# Patient Record
Sex: MALE | Race: OTHER | NOT HISPANIC OR LATINO | URBAN - METROPOLITAN AREA
[De-identification: names, ages, dates, MRNs, and addresses within clinical notes are randomized per-mention and may not be internally consistent; named-entity substitution may affect disease eponyms.]

---

## 2021-04-20 ENCOUNTER — INPATIENT (INPATIENT)
Facility: HOSPITAL | Age: 51
LOS: 2 days | Discharge: HOME | DRG: 280 | End: 2021-04-23
Attending: HOSPITALIST | Admitting: HOSPITALIST
Payer: MEDICARE

## 2021-04-20 VITALS
DIASTOLIC BLOOD PRESSURE: 86 MMHG | OXYGEN SATURATION: 91 % | SYSTOLIC BLOOD PRESSURE: 190 MMHG | HEART RATE: 122 BPM | TEMPERATURE: 99 F | RESPIRATION RATE: 38 BRPM | WEIGHT: 240.08 LBS

## 2021-04-20 DIAGNOSIS — Z95.1 PRESENCE OF AORTOCORONARY BYPASS GRAFT: Chronic | ICD-10-CM

## 2021-04-20 LAB
24R-OH-CALCIDIOL SERPL-MCNC: 15.8 NG/ML — LOW (ref 30–80)
A1C WITH ESTIMATED AVERAGE GLUCOSE RESULT: 7.9 % — HIGH (ref 4–5.6)
ALBUMIN SERPL ELPH-MCNC: 3.6 G/DL — SIGNIFICANT CHANGE UP (ref 3.3–5)
ALBUMIN SERPL ELPH-MCNC: 4.2 G/DL — SIGNIFICANT CHANGE UP (ref 3.3–5)
ALBUMIN SERPL ELPH-MCNC: 4.5 G/DL — SIGNIFICANT CHANGE UP (ref 3.3–5)
ALBUMIN SERPL ELPH-MCNC: 4.6 G/DL — SIGNIFICANT CHANGE UP (ref 3.3–5)
ALP SERPL-CCNC: 101 U/L — SIGNIFICANT CHANGE UP (ref 40–120)
ALP SERPL-CCNC: 108 U/L — SIGNIFICANT CHANGE UP (ref 40–120)
ALP SERPL-CCNC: 118 U/L — SIGNIFICANT CHANGE UP (ref 40–120)
ALP SERPL-CCNC: 92 U/L — SIGNIFICANT CHANGE UP (ref 40–120)
ALT FLD-CCNC: 19 U/L — SIGNIFICANT CHANGE UP (ref 10–45)
ALT FLD-CCNC: 22 U/L — SIGNIFICANT CHANGE UP (ref 10–45)
ALT FLD-CCNC: 23 U/L — SIGNIFICANT CHANGE UP (ref 10–45)
ALT FLD-CCNC: 24 U/L — SIGNIFICANT CHANGE UP (ref 10–45)
ANION GAP SERPL CALC-SCNC: 16 MMOL/L — SIGNIFICANT CHANGE UP (ref 5–17)
ANION GAP SERPL CALC-SCNC: 18 MMOL/L — HIGH (ref 5–17)
ANISOCYTOSIS BLD QL: SLIGHT — SIGNIFICANT CHANGE UP
APPEARANCE UR: CLEAR — SIGNIFICANT CHANGE UP
APTT BLD: 27.9 SEC — SIGNIFICANT CHANGE UP (ref 27.5–35.5)
APTT BLD: 27.9 SEC — SIGNIFICANT CHANGE UP (ref 27.5–35.5)
APTT BLD: 30.3 SEC — SIGNIFICANT CHANGE UP (ref 27.5–35.5)
APTT BLD: 31.1 SEC — SIGNIFICANT CHANGE UP (ref 27.5–35.5)
AST SERPL-CCNC: 20 U/L — SIGNIFICANT CHANGE UP (ref 10–40)
AST SERPL-CCNC: 21 U/L — SIGNIFICANT CHANGE UP (ref 10–40)
AST SERPL-CCNC: 21 U/L — SIGNIFICANT CHANGE UP (ref 10–40)
AST SERPL-CCNC: 32 U/L — SIGNIFICANT CHANGE UP (ref 10–40)
BASE EXCESS BLDA CALC-SCNC: -9.1 MMOL/L — LOW (ref -2–3)
BASE EXCESS BLDA CALC-SCNC: 0.6 MMOL/L — SIGNIFICANT CHANGE UP (ref -2–3)
BASOPHILS # BLD AUTO: 0.11 K/UL — SIGNIFICANT CHANGE UP (ref 0–0.2)
BASOPHILS # BLD AUTO: 0.33 K/UL — HIGH (ref 0–0.2)
BASOPHILS NFR BLD AUTO: 0.5 % — SIGNIFICANT CHANGE UP (ref 0–2)
BASOPHILS NFR BLD AUTO: 1.8 % — SIGNIFICANT CHANGE UP (ref 0–2)
BILIRUB SERPL-MCNC: 0.3 MG/DL — SIGNIFICANT CHANGE UP (ref 0.2–1.2)
BILIRUB SERPL-MCNC: 0.3 MG/DL — SIGNIFICANT CHANGE UP (ref 0.2–1.2)
BILIRUB SERPL-MCNC: 0.4 MG/DL — SIGNIFICANT CHANGE UP (ref 0.2–1.2)
BILIRUB SERPL-MCNC: 0.4 MG/DL — SIGNIFICANT CHANGE UP (ref 0.2–1.2)
BILIRUB UR-MCNC: NEGATIVE — SIGNIFICANT CHANGE UP
BLD GP AB SCN SERPL QL: NEGATIVE — SIGNIFICANT CHANGE UP
BLD GP AB SCN SERPL QL: NEGATIVE — SIGNIFICANT CHANGE UP
BUN SERPL-MCNC: 28 MG/DL — HIGH (ref 7–23)
BUN SERPL-MCNC: 68 MG/DL — HIGH (ref 7–23)
BUN SERPL-MCNC: 69 MG/DL — HIGH (ref 7–23)
BUN SERPL-MCNC: 73 MG/DL — HIGH (ref 7–23)
CALCIUM SERPL-MCNC: 7.6 MG/DL — LOW (ref 8.4–10.5)
CALCIUM SERPL-MCNC: 7.6 MG/DL — LOW (ref 8.4–10.5)
CALCIUM SERPL-MCNC: 7.7 MG/DL — LOW (ref 8.4–10.5)
CALCIUM SERPL-MCNC: 8.1 MG/DL — LOW (ref 8.4–10.5)
CALCIUM SERPL-MCNC: 9.2 MG/DL — SIGNIFICANT CHANGE UP (ref 8.4–10.5)
CHLORIDE SERPL-SCNC: 94 MMOL/L — LOW (ref 96–108)
CHLORIDE SERPL-SCNC: 97 MMOL/L — SIGNIFICANT CHANGE UP (ref 96–108)
CHLORIDE SERPL-SCNC: 98 MMOL/L — SIGNIFICANT CHANGE UP (ref 96–108)
CHLORIDE SERPL-SCNC: 99 MMOL/L — SIGNIFICANT CHANGE UP (ref 96–108)
CHOLEST SERPL-MCNC: 117 MG/DL — SIGNIFICANT CHANGE UP
CK MB CFR SERPL CALC: 11.1 NG/ML — HIGH (ref 0–6.7)
CK MB CFR SERPL CALC: 13.8 NG/ML — HIGH (ref 0–6.7)
CK MB CFR SERPL CALC: 15.2 NG/ML — HIGH (ref 0–6.7)
CK MB CFR SERPL CALC: 17.1 NG/ML — HIGH (ref 0–6.7)
CK MB CFR SERPL CALC: 8.3 NG/ML — HIGH (ref 0–6.7)
CK SERPL-CCNC: 433 U/L — HIGH (ref 30–200)
CK SERPL-CCNC: 449 U/L — HIGH (ref 30–200)
CK SERPL-CCNC: 454 U/L — HIGH (ref 30–200)
CK SERPL-CCNC: 473 U/L — HIGH (ref 30–200)
CK SERPL-CCNC: 534 U/L — HIGH (ref 30–200)
CO2 SERPL-SCNC: 18 MMOL/L — LOW (ref 22–31)
CO2 SERPL-SCNC: 19 MMOL/L — LOW (ref 22–31)
CO2 SERPL-SCNC: 19 MMOL/L — LOW (ref 22–31)
CO2 SERPL-SCNC: 26 MMOL/L — SIGNIFICANT CHANGE UP (ref 22–31)
COLOR SPEC: YELLOW — SIGNIFICANT CHANGE UP
CREAT SERPL-MCNC: 3.6 MG/DL — HIGH (ref 0.5–1.3)
CREAT SERPL-MCNC: 7.7 MG/DL — HIGH (ref 0.5–1.3)
CREAT SERPL-MCNC: 8.01 MG/DL — HIGH (ref 0.5–1.3)
CREAT SERPL-MCNC: 8.3 MG/DL — HIGH (ref 0.5–1.3)
DIFF PNL FLD: ABNORMAL
EOSINOPHIL # BLD AUTO: 0 K/UL — SIGNIFICANT CHANGE UP (ref 0–0.5)
EOSINOPHIL # BLD AUTO: 0.16 K/UL — SIGNIFICANT CHANGE UP (ref 0–0.5)
EOSINOPHIL NFR BLD AUTO: 0 % — SIGNIFICANT CHANGE UP (ref 0–6)
EOSINOPHIL NFR BLD AUTO: 0.8 % — SIGNIFICANT CHANGE UP (ref 0–6)
ESTIMATED AVERAGE GLUCOSE: 180 MG/DL — HIGH (ref 68–114)
FERRITIN SERPL-MCNC: 562 NG/ML — HIGH (ref 30–400)
GAS PNL BLDA: SIGNIFICANT CHANGE UP
GAS PNL BLDA: SIGNIFICANT CHANGE UP
GIANT PLATELETS BLD QL SMEAR: PRESENT — SIGNIFICANT CHANGE UP
GLUCOSE BLDC GLUCOMTR-MCNC: 165 MG/DL — HIGH (ref 70–99)
GLUCOSE BLDC GLUCOMTR-MCNC: 190 MG/DL — HIGH (ref 70–99)
GLUCOSE BLDC GLUCOMTR-MCNC: 202 MG/DL — HIGH (ref 70–99)
GLUCOSE BLDC GLUCOMTR-MCNC: 303 MG/DL — HIGH (ref 70–99)
GLUCOSE BLDC GLUCOMTR-MCNC: 421 MG/DL — HIGH (ref 70–99)
GLUCOSE SERPL-MCNC: 157 MG/DL — HIGH (ref 70–99)
GLUCOSE SERPL-MCNC: 233 MG/DL — HIGH (ref 70–99)
GLUCOSE SERPL-MCNC: 293 MG/DL — HIGH (ref 70–99)
GLUCOSE SERPL-MCNC: 299 MG/DL — HIGH (ref 70–99)
GLUCOSE UR QL: 500
HBV CORE AB SER-ACNC: SIGNIFICANT CHANGE UP
HBV SURFACE AB SER-ACNC: SIGNIFICANT CHANGE UP
HBV SURFACE AG SER-ACNC: SIGNIFICANT CHANGE UP
HCO3 BLDA-SCNC: 17 MMOL/L — LOW (ref 21–28)
HCO3 BLDA-SCNC: 25 MMOL/L — SIGNIFICANT CHANGE UP (ref 21–28)
HCT VFR BLD CALC: 33.7 % — LOW (ref 39–50)
HCT VFR BLD CALC: 38.6 % — LOW (ref 39–50)
HCT VFR BLD CALC: 40.5 % — SIGNIFICANT CHANGE UP (ref 39–50)
HCV AB S/CO SERPL IA: 0.08 S/CO — SIGNIFICANT CHANGE UP
HCV AB SERPL-IMP: SIGNIFICANT CHANGE UP
HDLC SERPL-MCNC: 36 MG/DL — LOW
HGB BLD-MCNC: 10.6 G/DL — LOW (ref 13–17)
HGB BLD-MCNC: 12.1 G/DL — LOW (ref 13–17)
HGB BLD-MCNC: 12.5 G/DL — LOW (ref 13–17)
IMM GRANULOCYTES NFR BLD AUTO: 0.4 % — SIGNIFICANT CHANGE UP (ref 0–1.5)
INR BLD: 1.04 — SIGNIFICANT CHANGE UP (ref 0.88–1.16)
INR BLD: 1.05 — SIGNIFICANT CHANGE UP (ref 0.88–1.16)
INR BLD: 1.08 — SIGNIFICANT CHANGE UP (ref 0.88–1.16)
IRON SATN MFR SERPL: 16 UG/DL — LOW (ref 45–165)
IRON SATN MFR SERPL: 7 % — LOW (ref 16–55)
KETONES UR-MCNC: NEGATIVE — SIGNIFICANT CHANGE UP
LACTATE SERPL-SCNC: 1.7 MMOL/L — SIGNIFICANT CHANGE UP (ref 0.5–2)
LACTATE SERPL-SCNC: 2.6 MMOL/L — HIGH (ref 0.5–2)
LEUKOCYTE ESTERASE UR-ACNC: NEGATIVE — SIGNIFICANT CHANGE UP
LIPID PNL WITH DIRECT LDL SERPL: 23 MG/DL — SIGNIFICANT CHANGE UP
LYMPHOCYTES # BLD AUTO: 0.52 K/UL — LOW (ref 1–3.3)
LYMPHOCYTES # BLD AUTO: 10.7 % — LOW (ref 13–44)
LYMPHOCYTES # BLD AUTO: 2.14 K/UL — SIGNIFICANT CHANGE UP (ref 1–3.3)
LYMPHOCYTES # BLD AUTO: 2.8 % — LOW (ref 13–44)
MACROCYTES BLD QL: SLIGHT — SIGNIFICANT CHANGE UP
MAGNESIUM SERPL-MCNC: 1.8 MG/DL — SIGNIFICANT CHANGE UP (ref 1.6–2.6)
MAGNESIUM SERPL-MCNC: 1.8 MG/DL — SIGNIFICANT CHANGE UP (ref 1.6–2.6)
MAGNESIUM SERPL-MCNC: 1.9 MG/DL — SIGNIFICANT CHANGE UP (ref 1.6–2.6)
MAGNESIUM SERPL-MCNC: 1.9 MG/DL — SIGNIFICANT CHANGE UP (ref 1.6–2.6)
MANUAL SMEAR VERIFICATION: SIGNIFICANT CHANGE UP
MCHC RBC-ENTMCNC: 27.4 PG — SIGNIFICANT CHANGE UP (ref 27–34)
MCHC RBC-ENTMCNC: 27.7 PG — SIGNIFICANT CHANGE UP (ref 27–34)
MCHC RBC-ENTMCNC: 27.9 PG — SIGNIFICANT CHANGE UP (ref 27–34)
MCHC RBC-ENTMCNC: 30.9 GM/DL — LOW (ref 32–36)
MCHC RBC-ENTMCNC: 31.3 GM/DL — LOW (ref 32–36)
MCHC RBC-ENTMCNC: 31.5 GM/DL — LOW (ref 32–36)
MCV RBC AUTO: 88.2 FL — SIGNIFICANT CHANGE UP (ref 80–100)
MCV RBC AUTO: 88.8 FL — SIGNIFICANT CHANGE UP (ref 80–100)
MCV RBC AUTO: 88.9 FL — SIGNIFICANT CHANGE UP (ref 80–100)
MONOCYTES # BLD AUTO: 0 K/UL — SIGNIFICANT CHANGE UP (ref 0–0.9)
MONOCYTES # BLD AUTO: 1.42 K/UL — HIGH (ref 0–0.9)
MONOCYTES NFR BLD AUTO: 0 % — LOW (ref 2–14)
MONOCYTES NFR BLD AUTO: 7.1 % — SIGNIFICANT CHANGE UP (ref 2–14)
MRSA PCR RESULT.: NEGATIVE — SIGNIFICANT CHANGE UP
MYELOCYTES NFR BLD: 0.9 % — HIGH (ref 0–0)
NEUTROPHILS # BLD AUTO: 16.17 K/UL — HIGH (ref 1.8–7.4)
NEUTROPHILS # BLD AUTO: 17.46 K/UL — HIGH (ref 1.8–7.4)
NEUTROPHILS NFR BLD AUTO: 80.5 % — HIGH (ref 43–77)
NEUTROPHILS NFR BLD AUTO: 94.5 % — HIGH (ref 43–77)
NITRITE UR-MCNC: NEGATIVE — SIGNIFICANT CHANGE UP
NON HDL CHOLESTEROL: 81 MG/DL — SIGNIFICANT CHANGE UP
NRBC # BLD: 0 /100 WBCS — SIGNIFICANT CHANGE UP (ref 0–0)
NRBC # BLD: 0 /100 WBCS — SIGNIFICANT CHANGE UP (ref 0–0)
NT-PROBNP SERPL-SCNC: HIGH PG/ML (ref 0–300)
OVALOCYTES BLD QL SMEAR: SLIGHT — SIGNIFICANT CHANGE UP
PCO2 BLDA: 38 MMHG — SIGNIFICANT CHANGE UP (ref 35–48)
PCO2 BLDA: 39 MMHG — SIGNIFICANT CHANGE UP (ref 35–48)
PCP SPEC-MCNC: SIGNIFICANT CHANGE UP
PH BLDA: 7.27 — LOW (ref 7.35–7.45)
PH BLDA: 7.42 — SIGNIFICANT CHANGE UP (ref 7.35–7.45)
PH UR: 6 — SIGNIFICANT CHANGE UP (ref 5–8)
PHOSPHATE SERPL-MCNC: 2.9 MG/DL — SIGNIFICANT CHANGE UP (ref 2.5–4.5)
PHOSPHATE SERPL-MCNC: 4.2 MG/DL — SIGNIFICANT CHANGE UP (ref 2.5–4.5)
PHOSPHATE SERPL-MCNC: 5.2 MG/DL — HIGH (ref 2.5–4.5)
PHOSPHATE SERPL-MCNC: 5.6 MG/DL — HIGH (ref 2.5–4.5)
PLAT MORPH BLD: ABNORMAL
PLATELET # BLD AUTO: 119 K/UL — LOW (ref 150–400)
PLATELET # BLD AUTO: 164 K/UL — SIGNIFICANT CHANGE UP (ref 150–400)
PLATELET # BLD AUTO: 181 K/UL — SIGNIFICANT CHANGE UP (ref 150–400)
PO2 BLDA: 140 MMHG — HIGH (ref 83–108)
PO2 BLDA: 355 MMHG — HIGH (ref 83–108)
POIKILOCYTOSIS BLD QL AUTO: SLIGHT — SIGNIFICANT CHANGE UP
POLYCHROMASIA BLD QL SMEAR: SLIGHT — SIGNIFICANT CHANGE UP
POTASSIUM SERPL-MCNC: 3.4 MMOL/L — LOW (ref 3.5–5.3)
POTASSIUM SERPL-MCNC: 5.1 MMOL/L — SIGNIFICANT CHANGE UP (ref 3.5–5.3)
POTASSIUM SERPL-MCNC: 5.8 MMOL/L — HIGH (ref 3.5–5.3)
POTASSIUM SERPL-MCNC: 6.7 MMOL/L — CRITICAL HIGH (ref 3.5–5.3)
POTASSIUM SERPL-SCNC: 3.4 MMOL/L — LOW (ref 3.5–5.3)
POTASSIUM SERPL-SCNC: 5.1 MMOL/L — SIGNIFICANT CHANGE UP (ref 3.5–5.3)
POTASSIUM SERPL-SCNC: 5.8 MMOL/L — HIGH (ref 3.5–5.3)
POTASSIUM SERPL-SCNC: 6.7 MMOL/L — CRITICAL HIGH (ref 3.5–5.3)
PROT SERPL-MCNC: 6.8 G/DL — SIGNIFICANT CHANGE UP (ref 6–8.3)
PROT SERPL-MCNC: 7.9 G/DL — SIGNIFICANT CHANGE UP (ref 6–8.3)
PROT SERPL-MCNC: 8.3 G/DL — SIGNIFICANT CHANGE UP (ref 6–8.3)
PROT SERPL-MCNC: 8.3 G/DL — SIGNIFICANT CHANGE UP (ref 6–8.3)
PROT UR-MCNC: 100 MG/DL
PROTHROM AB SERPL-ACNC: 12.5 SEC — SIGNIFICANT CHANGE UP (ref 10.6–13.6)
PROTHROM AB SERPL-ACNC: 12.6 SEC — SIGNIFICANT CHANGE UP (ref 10.6–13.6)
PROTHROM AB SERPL-ACNC: 12.9 SEC — SIGNIFICANT CHANGE UP (ref 10.6–13.6)
PTH-INTACT FLD-MCNC: 236 PG/ML — HIGH (ref 15–65)
RAPID RVP RESULT: SIGNIFICANT CHANGE UP
RBC # BLD: 3.82 M/UL — LOW (ref 4.2–5.8)
RBC # BLD: 4.34 M/UL — SIGNIFICANT CHANGE UP (ref 4.2–5.8)
RBC # BLD: 4.56 M/UL — SIGNIFICANT CHANGE UP (ref 4.2–5.8)
RBC # FLD: 15 % — HIGH (ref 10.3–14.5)
RBC # FLD: 15 % — HIGH (ref 10.3–14.5)
RBC # FLD: 15.3 % — HIGH (ref 10.3–14.5)
RBC BLD AUTO: ABNORMAL
RH IG SCN BLD-IMP: POSITIVE — SIGNIFICANT CHANGE UP
RH IG SCN BLD-IMP: POSITIVE — SIGNIFICANT CHANGE UP
S AUREUS DNA NOSE QL NAA+PROBE: POSITIVE
SAO2 % BLDA: 98 % — SIGNIFICANT CHANGE UP (ref 95–100)
SAO2 % BLDA: 99 % — SIGNIFICANT CHANGE UP (ref 95–100)
SARS-COV-2 RNA SPEC QL NAA+PROBE: SIGNIFICANT CHANGE UP
SMUDGE CELLS # BLD: PRESENT — SIGNIFICANT CHANGE UP
SODIUM SERPL-SCNC: 132 MMOL/L — LOW (ref 135–145)
SODIUM SERPL-SCNC: 132 MMOL/L — LOW (ref 135–145)
SODIUM SERPL-SCNC: 136 MMOL/L — SIGNIFICANT CHANGE UP (ref 135–145)
SODIUM SERPL-SCNC: 136 MMOL/L — SIGNIFICANT CHANGE UP (ref 135–145)
SP GR SPEC: 1.02 — SIGNIFICANT CHANGE UP (ref 1–1.03)
TIBC SERPL-MCNC: 214 UG/DL — LOW (ref 220–430)
TRIGL SERPL-MCNC: 292 MG/DL — HIGH
TRIGL SERPL-MCNC: 306 MG/DL — HIGH
TROPONIN T SERPL-MCNC: 0.14 NG/ML — CRITICAL HIGH (ref 0–0.01)
TROPONIN T SERPL-MCNC: 0.31 NG/ML — CRITICAL HIGH (ref 0–0.01)
TROPONIN T SERPL-MCNC: 1.12 NG/ML — CRITICAL HIGH (ref 0–0.01)
TROPONIN T SERPL-MCNC: 1.68 NG/ML — CRITICAL HIGH (ref 0–0.01)
TSH SERPL-MCNC: 0.68 UIU/ML — SIGNIFICANT CHANGE UP (ref 0.35–4.94)
UIBC SERPL-MCNC: 198 UG/DL — SIGNIFICANT CHANGE UP (ref 110–370)
UROBILINOGEN FLD QL: 0.2 E.U./DL — SIGNIFICANT CHANGE UP
VIT D25+D1,25 OH+D1,25 PNL SERPL-MCNC: 10.4 PG/ML — LOW (ref 19.9–79.3)
WBC # BLD: 18.48 K/UL — HIGH (ref 3.8–10.5)
WBC # BLD: 20.09 K/UL — HIGH (ref 3.8–10.5)
WBC # BLD: 24.37 K/UL — HIGH (ref 3.8–10.5)
WBC # FLD AUTO: 18.48 K/UL — HIGH (ref 3.8–10.5)
WBC # FLD AUTO: 20.09 K/UL — HIGH (ref 3.8–10.5)
WBC # FLD AUTO: 24.37 K/UL — HIGH (ref 3.8–10.5)

## 2021-04-20 PROCEDURE — 71045 X-RAY EXAM CHEST 1 VIEW: CPT | Mod: 26,77

## 2021-04-20 PROCEDURE — 71045 X-RAY EXAM CHEST 1 VIEW: CPT | Mod: 26,77,59

## 2021-04-20 PROCEDURE — 93010 ELECTROCARDIOGRAM REPORT: CPT | Mod: 59

## 2021-04-20 PROCEDURE — 93308 TTE F-UP OR LMTD: CPT | Mod: 26

## 2021-04-20 PROCEDURE — 99291 CRITICAL CARE FIRST HOUR: CPT

## 2021-04-20 PROCEDURE — 99292 CRITICAL CARE ADDL 30 MIN: CPT

## 2021-04-20 PROCEDURE — 36620 INSERTION CATHETER ARTERY: CPT

## 2021-04-20 PROCEDURE — 99222 1ST HOSP IP/OBS MODERATE 55: CPT | Mod: GC

## 2021-04-20 PROCEDURE — 71045 X-RAY EXAM CHEST 1 VIEW: CPT | Mod: 26

## 2021-04-20 PROCEDURE — 99223 1ST HOSP IP/OBS HIGH 75: CPT

## 2021-04-20 PROCEDURE — 99292 CRITICAL CARE ADDL 30 MIN: CPT | Mod: 25

## 2021-04-20 PROCEDURE — 99291 CRITICAL CARE FIRST HOUR: CPT | Mod: 25

## 2021-04-20 PROCEDURE — 71045 X-RAY EXAM CHEST 1 VIEW: CPT | Mod: 26,59

## 2021-04-20 RX ORDER — DEXTROSE 50 % IN WATER 50 %
12.5 SYRINGE (ML) INTRAVENOUS ONCE
Refills: 0 | Status: DISCONTINUED | OUTPATIENT
Start: 2021-04-20 | End: 2021-04-23

## 2021-04-20 RX ORDER — CLOPIDOGREL BISULFATE 75 MG/1
300 TABLET, FILM COATED ORAL ONCE
Refills: 0 | Status: DISCONTINUED | OUTPATIENT
Start: 2021-04-20 | End: 2021-04-20

## 2021-04-20 RX ORDER — NOREPINEPHRINE BITARTRATE/D5W 8 MG/250ML
0.05 PLASTIC BAG, INJECTION (ML) INTRAVENOUS
Qty: 8 | Refills: 0 | Status: DISCONTINUED | OUTPATIENT
Start: 2021-04-20 | End: 2021-04-21

## 2021-04-20 RX ORDER — FUROSEMIDE 40 MG
80 TABLET ORAL ONCE
Refills: 0 | Status: COMPLETED | OUTPATIENT
Start: 2021-04-20 | End: 2021-04-20

## 2021-04-20 RX ORDER — CHLORHEXIDINE GLUCONATE 213 G/1000ML
15 SOLUTION TOPICAL EVERY 12 HOURS
Refills: 0 | Status: DISCONTINUED | OUTPATIENT
Start: 2021-04-20 | End: 2021-04-20

## 2021-04-20 RX ORDER — ASPIRIN/CALCIUM CARB/MAGNESIUM 324 MG
325 TABLET ORAL DAILY
Refills: 0 | Status: DISCONTINUED | OUTPATIENT
Start: 2021-04-20 | End: 2021-04-20

## 2021-04-20 RX ORDER — ASPIRIN/CALCIUM CARB/MAGNESIUM 324 MG
300 TABLET ORAL ONCE
Refills: 0 | Status: COMPLETED | OUTPATIENT
Start: 2021-04-20 | End: 2021-04-20

## 2021-04-20 RX ORDER — PANTOPRAZOLE SODIUM 20 MG/1
40 TABLET, DELAYED RELEASE ORAL EVERY 24 HOURS
Refills: 0 | Status: DISCONTINUED | OUTPATIENT
Start: 2021-04-20 | End: 2021-04-22

## 2021-04-20 RX ORDER — DEXTROSE 50 % IN WATER 50 %
25 SYRINGE (ML) INTRAVENOUS ONCE
Refills: 0 | Status: DISCONTINUED | OUTPATIENT
Start: 2021-04-20 | End: 2021-04-23

## 2021-04-20 RX ORDER — DEXTROSE 50 % IN WATER 50 %
50 SYRINGE (ML) INTRAVENOUS ONCE
Refills: 0 | Status: COMPLETED | OUTPATIENT
Start: 2021-04-20 | End: 2021-04-20

## 2021-04-20 RX ORDER — AZITHROMYCIN 500 MG/1
500 TABLET, FILM COATED ORAL ONCE
Refills: 0 | Status: COMPLETED | OUTPATIENT
Start: 2021-04-20 | End: 2021-04-20

## 2021-04-20 RX ORDER — CLOPIDOGREL BISULFATE 75 MG/1
300 TABLET, FILM COATED ORAL ONCE
Refills: 0 | Status: COMPLETED | OUTPATIENT
Start: 2021-04-20 | End: 2021-04-20

## 2021-04-20 RX ORDER — HYDRALAZINE HCL 50 MG
10 TABLET ORAL ONCE
Refills: 0 | Status: COMPLETED | OUTPATIENT
Start: 2021-04-20 | End: 2021-04-20

## 2021-04-20 RX ORDER — SODIUM CHLORIDE 9 MG/ML
1000 INJECTION, SOLUTION INTRAVENOUS
Refills: 0 | Status: DISCONTINUED | OUTPATIENT
Start: 2021-04-20 | End: 2021-04-23

## 2021-04-20 RX ORDER — PROPOFOL 10 MG/ML
5 INJECTION, EMULSION INTRAVENOUS
Qty: 1000 | Refills: 0 | Status: DISCONTINUED | OUTPATIENT
Start: 2021-04-20 | End: 2021-04-22

## 2021-04-20 RX ORDER — HEPARIN SODIUM 5000 [USP'U]/ML
1000 INJECTION INTRAVENOUS; SUBCUTANEOUS
Qty: 25000 | Refills: 0 | Status: DISCONTINUED | OUTPATIENT
Start: 2021-04-20 | End: 2021-04-21

## 2021-04-20 RX ORDER — GLUCAGON INJECTION, SOLUTION 0.5 MG/.1ML
1 INJECTION, SOLUTION SUBCUTANEOUS ONCE
Refills: 0 | Status: DISCONTINUED | OUTPATIENT
Start: 2021-04-20 | End: 2021-04-23

## 2021-04-20 RX ORDER — NITROGLYCERIN 6.5 MG
0.4 CAPSULE, EXTENDED RELEASE ORAL ONCE
Refills: 0 | Status: COMPLETED | OUTPATIENT
Start: 2021-04-20 | End: 2021-04-20

## 2021-04-20 RX ORDER — CEFTRIAXONE 500 MG/1
INJECTION, POWDER, FOR SOLUTION INTRAMUSCULAR; INTRAVENOUS
Refills: 0 | Status: DISCONTINUED | OUTPATIENT
Start: 2021-04-20 | End: 2021-04-20

## 2021-04-20 RX ORDER — CEFTRIAXONE 500 MG/1
1000 INJECTION, POWDER, FOR SOLUTION INTRAMUSCULAR; INTRAVENOUS ONCE
Refills: 0 | Status: COMPLETED | OUTPATIENT
Start: 2021-04-20 | End: 2021-04-20

## 2021-04-20 RX ORDER — ATORVASTATIN CALCIUM 80 MG/1
80 TABLET, FILM COATED ORAL ONCE
Refills: 0 | Status: COMPLETED | OUTPATIENT
Start: 2021-04-20 | End: 2021-04-20

## 2021-04-20 RX ORDER — INSULIN HUMAN 100 [IU]/ML
INJECTION, SOLUTION SUBCUTANEOUS EVERY 6 HOURS
Refills: 0 | Status: DISCONTINUED | OUTPATIENT
Start: 2021-04-20 | End: 2021-04-23

## 2021-04-20 RX ORDER — CEFTRIAXONE 500 MG/1
2000 INJECTION, POWDER, FOR SOLUTION INTRAMUSCULAR; INTRAVENOUS EVERY 24 HOURS
Refills: 0 | Status: DISCONTINUED | OUTPATIENT
Start: 2021-04-20 | End: 2021-04-22

## 2021-04-20 RX ORDER — CHLORHEXIDINE GLUCONATE 213 G/1000ML
15 SOLUTION TOPICAL EVERY 12 HOURS
Refills: 0 | Status: DISCONTINUED | OUTPATIENT
Start: 2021-04-20 | End: 2021-04-22

## 2021-04-20 RX ORDER — ATORVASTATIN CALCIUM 80 MG/1
80 TABLET, FILM COATED ORAL EVERY 24 HOURS
Refills: 0 | Status: DISCONTINUED | OUTPATIENT
Start: 2021-04-21 | End: 2021-04-23

## 2021-04-20 RX ORDER — PROPOFOL 10 MG/ML
5 INJECTION, EMULSION INTRAVENOUS
Qty: 500 | Refills: 0 | Status: DISCONTINUED | OUTPATIENT
Start: 2021-04-20 | End: 2021-04-20

## 2021-04-20 RX ORDER — PIPERACILLIN AND TAZOBACTAM 4; .5 G/20ML; G/20ML
2.25 INJECTION, POWDER, LYOPHILIZED, FOR SOLUTION INTRAVENOUS EVERY 8 HOURS
Refills: 0 | Status: DISCONTINUED | OUTPATIENT
Start: 2021-04-20 | End: 2021-04-20

## 2021-04-20 RX ORDER — DEXTROSE 50 % IN WATER 50 %
15 SYRINGE (ML) INTRAVENOUS ONCE
Refills: 0 | Status: DISCONTINUED | OUTPATIENT
Start: 2021-04-20 | End: 2021-04-23

## 2021-04-20 RX ORDER — CLOPIDOGREL BISULFATE 75 MG/1
75 TABLET, FILM COATED ORAL EVERY 24 HOURS
Refills: 0 | Status: DISCONTINUED | OUTPATIENT
Start: 2021-04-21 | End: 2021-04-22

## 2021-04-20 RX ORDER — ACETAMINOPHEN 500 MG
1000 TABLET ORAL ONCE
Refills: 0 | Status: COMPLETED | OUTPATIENT
Start: 2021-04-20 | End: 2021-04-20

## 2021-04-20 RX ORDER — CALCIUM GLUCONATE 100 MG/ML
2 VIAL (ML) INTRAVENOUS ONCE
Refills: 0 | Status: COMPLETED | OUTPATIENT
Start: 2021-04-20 | End: 2021-04-20

## 2021-04-20 RX ORDER — LABETALOL HCL 100 MG
20 TABLET ORAL ONCE
Refills: 0 | Status: COMPLETED | OUTPATIENT
Start: 2021-04-20 | End: 2021-04-20

## 2021-04-20 RX ORDER — HEPARIN SODIUM 5000 [USP'U]/ML
5000 INJECTION INTRAVENOUS; SUBCUTANEOUS EVERY 8 HOURS
Refills: 0 | Status: DISCONTINUED | OUTPATIENT
Start: 2021-04-20 | End: 2021-04-20

## 2021-04-20 RX ORDER — INSULIN LISPRO 100/ML
VIAL (ML) SUBCUTANEOUS EVERY 6 HOURS
Refills: 0 | Status: DISCONTINUED | OUTPATIENT
Start: 2021-04-20 | End: 2021-04-20

## 2021-04-20 RX ORDER — CHLORHEXIDINE GLUCONATE 213 G/1000ML
1 SOLUTION TOPICAL
Refills: 0 | Status: DISCONTINUED | OUTPATIENT
Start: 2021-04-20 | End: 2021-04-23

## 2021-04-20 RX ORDER — VANCOMYCIN HCL 1 G
1500 VIAL (EA) INTRAVENOUS ONCE
Refills: 0 | Status: COMPLETED | OUTPATIENT
Start: 2021-04-20 | End: 2021-04-20

## 2021-04-20 RX ORDER — INSULIN HUMAN 100 [IU]/ML
10 INJECTION, SOLUTION SUBCUTANEOUS ONCE
Refills: 0 | Status: COMPLETED | OUTPATIENT
Start: 2021-04-20 | End: 2021-04-20

## 2021-04-20 RX ORDER — ASPIRIN/CALCIUM CARB/MAGNESIUM 324 MG
81 TABLET ORAL DAILY
Refills: 0 | Status: DISCONTINUED | OUTPATIENT
Start: 2021-04-21 | End: 2021-04-23

## 2021-04-20 RX ADMIN — CEFTRIAXONE 100 MILLIGRAM(S): 500 INJECTION, POWDER, FOR SOLUTION INTRAMUSCULAR; INTRAVENOUS at 01:24

## 2021-04-20 RX ADMIN — CHLORHEXIDINE GLUCONATE 1 APPLICATION(S): 213 SOLUTION TOPICAL at 07:18

## 2021-04-20 RX ADMIN — HEPARIN SODIUM 5000 UNIT(S): 5000 INJECTION INTRAVENOUS; SUBCUTANEOUS at 05:08

## 2021-04-20 RX ADMIN — PROPOFOL 3.27 MICROGRAM(S)/KG/MIN: 10 INJECTION, EMULSION INTRAVENOUS at 13:52

## 2021-04-20 RX ADMIN — Medication 0.4 MILLIGRAM(S): at 00:46

## 2021-04-20 RX ADMIN — Medication 200 GRAM(S): at 04:21

## 2021-04-20 RX ADMIN — PROPOFOL 3.27 MICROGRAM(S)/KG/MIN: 10 INJECTION, EMULSION INTRAVENOUS at 20:29

## 2021-04-20 RX ADMIN — PANTOPRAZOLE SODIUM 40 MILLIGRAM(S): 20 TABLET, DELAYED RELEASE ORAL at 05:08

## 2021-04-20 RX ADMIN — PROPOFOL 3.27 MICROGRAM(S)/KG/MIN: 10 INJECTION, EMULSION INTRAVENOUS at 17:14

## 2021-04-20 RX ADMIN — Medication 0.4 MILLIGRAM(S): at 00:29

## 2021-04-20 RX ADMIN — ATORVASTATIN CALCIUM 80 MILLIGRAM(S): 80 TABLET, FILM COATED ORAL at 12:36

## 2021-04-20 RX ADMIN — PROPOFOL 3.27 MICROGRAM(S)/KG/MIN: 10 INJECTION, EMULSION INTRAVENOUS at 23:56

## 2021-04-20 RX ADMIN — CLOPIDOGREL BISULFATE 300 MILLIGRAM(S): 75 TABLET, FILM COATED ORAL at 12:36

## 2021-04-20 RX ADMIN — Medication 20 MILLIGRAM(S): at 00:43

## 2021-04-20 RX ADMIN — HEPARIN SODIUM 10 UNIT(S)/HR: 5000 INJECTION INTRAVENOUS; SUBCUTANEOUS at 11:27

## 2021-04-20 RX ADMIN — Medication 10.2 MICROGRAM(S)/KG/MIN: at 09:17

## 2021-04-20 RX ADMIN — AZITHROMYCIN 255 MILLIGRAM(S): 500 TABLET, FILM COATED ORAL at 01:49

## 2021-04-20 RX ADMIN — PROPOFOL 3.27 MICROGRAM(S)/KG/MIN: 10 INJECTION, EMULSION INTRAVENOUS at 02:42

## 2021-04-20 RX ADMIN — PROPOFOL 3.27 MICROGRAM(S)/KG/MIN: 10 INJECTION, EMULSION INTRAVENOUS at 10:44

## 2021-04-20 RX ADMIN — INSULIN HUMAN 2: 100 INJECTION, SOLUTION SUBCUTANEOUS at 23:48

## 2021-04-20 RX ADMIN — Medication 300 MILLIGRAM(S): at 12:36

## 2021-04-20 RX ADMIN — Medication 400 MILLIGRAM(S): at 01:24

## 2021-04-20 RX ADMIN — Medication 50 MILLILITER(S): at 04:22

## 2021-04-20 RX ADMIN — CHLORHEXIDINE GLUCONATE 15 MILLILITER(S): 213 SOLUTION TOPICAL at 17:00

## 2021-04-20 RX ADMIN — Medication 10 MILLIGRAM(S): at 01:09

## 2021-04-20 RX ADMIN — PIPERACILLIN AND TAZOBACTAM 200 GRAM(S): 4; .5 INJECTION, POWDER, LYOPHILIZED, FOR SOLUTION INTRAVENOUS at 10:44

## 2021-04-20 RX ADMIN — Medication 3: at 06:35

## 2021-04-20 RX ADMIN — PIPERACILLIN AND TAZOBACTAM 200 GRAM(S): 4; .5 INJECTION, POWDER, LYOPHILIZED, FOR SOLUTION INTRAVENOUS at 04:32

## 2021-04-20 RX ADMIN — PROPOFOL 3.27 MICROGRAM(S)/KG/MIN: 10 INJECTION, EMULSION INTRAVENOUS at 07:21

## 2021-04-20 RX ADMIN — CHLORHEXIDINE GLUCONATE 15 MILLILITER(S): 213 SOLUTION TOPICAL at 06:42

## 2021-04-20 RX ADMIN — INSULIN HUMAN 10 UNIT(S): 100 INJECTION, SOLUTION SUBCUTANEOUS at 04:21

## 2021-04-20 RX ADMIN — INSULIN HUMAN 4: 100 INJECTION, SOLUTION SUBCUTANEOUS at 11:44

## 2021-04-20 RX ADMIN — PROPOFOL 3.27 MICROGRAM(S)/KG/MIN: 10 INJECTION, EMULSION INTRAVENOUS at 06:11

## 2021-04-20 RX ADMIN — INSULIN HUMAN 2: 100 INJECTION, SOLUTION SUBCUTANEOUS at 17:53

## 2021-04-20 RX ADMIN — Medication 80 MILLIGRAM(S): at 13:03

## 2021-04-20 RX ADMIN — Medication 300 MILLIGRAM(S): at 05:08

## 2021-04-20 RX ADMIN — Medication 80 MILLIGRAM(S): at 00:29

## 2021-04-20 RX ADMIN — CEFTRIAXONE 100 MILLIGRAM(S): 500 INJECTION, POWDER, FOR SOLUTION INTRAMUSCULAR; INTRAVENOUS at 17:07

## 2021-04-20 NOTE — CONSULT NOTE ADULT - ASSESSMENT
51 year old M with history of ESRD ( HD mwf), cad s/p cabgx4 (unknown date), stents (unknown dates), pacemaker, htn, presents today with severe shortness of breath after missing dialysis. Patient admitted for hypoxic and hypercarbic, acute respiratory failure 2/2 to pulmonary edema pending urgent dialysis, HTN emergency.        Neurology  #Sedated  - sedated on propofol gtt    CARDIOVASCULAR  #CAD s/p CABG and stents. Unable to obtain history due to severe respiratory failure while on BiPAP.   - will need collateral if patient on ASA and/or plavix/brillinta     #HTN Emergency. Presents with elevated . Most likely etiology due to ESRD. While in the ED, patient received IV labetalol, hydralazine, nitroglycerin, and lasix. Now patient intubated on propofol gtt with SBP in 100s.   - no need for further BP meds    PULMONARY  #Acute hypoxic and hypercarbic respiratory failure. Presents after missing dialysis. Last session was on Friday prior to admission. CXR with bilateral opacities most likely representative of pulmonary edema. ABG performed while on Bipap (EPap/IPap 25/10, fio2 70, RR 15) was 7.21/51/    GASTROINTESTINAL  #    RENAL  #    INFECTIOUS DISEASE  #    ENDOCRINE  #    HEME  #    FLUIDS/ELECTROLYTES/NUTRITION  -IVF None  -Monitor, Replete to K>4 and Mg>2  -Diet, NPO     PROPHYLAXIS  -DVT: HSQ  -GI- PPI qd    DISPO: MICU, COVID   FULL CODE 51 year old M with history of ESRD ( HD mwf), cad s/p cabgx4 (unknown date), stents (unknown dates), pacemaker, htn, presents today with severe shortness of breath after missing dialysis. Patient admitted for hypoxic and hypercarbic, acute respiratory failure 2/2 to pulmonary edema pending urgent dialysis, HTN emergency.        Neurology  #Sedated  - sedated on propofol gtt    CARDIOVASCULAR  #CAD s/p CABG and stents. Unable to obtain history due to severe respiratory failure while on BiPAP.   - will need collateral if patient on ASA and/or plavix/brillinta     #HTN Emergency. Presents with elevated . Most likely etiology due to ESRD. While in the ED, patient received IV labetalol, hydralazine, nitroglycerin, and lasix. Now patient intubated on propofol gtt with SBP in 100s.   - no need for further BP meds    PULMONARY  #Acute hypoxic and hypercarbic respiratory failure. Presents after missing dialysis. Last session was on Friday prior to admission. CXR with bilateral opacities most likely representative of pulmonary edema. ABG performed while on Bipap (EPap/IPap 25/10, fio2 70, RR 15) was 7.21/51/130. Elevated lactate of 2.6. Now s/p intubation.   - will require urgent dialysis, renal consulted with plan to perform overnight   - cxr in am to monitor for interval change in pulmonary edema   - ABG in the AM   - repeat ABG s/p intubation     GASTROINTESTINAL  #No active issues     RENAL  #ESRD. Dialysis with fistula on right upper extremity. Last dialysis session on 4/16. Missed dialysis because he was traveling. Now with CXR with opacities concerning for pulmonary edema   - urgent dialysis     #Mixed acidosis with anion gap.       INFECTIOUS DISEASE  #Severe sepsis   #R/O COVID-19 Infection.     ENDOCRINE  #No active issues     HEME  #Leukocytosis   #Anemia    FLUIDS/ELECTROLYTES/NUTRITION  -IVF None  -Monitor, Replete to K>4 and Mg>2  -Diet, NPO     PROPHYLAXIS  -DVT: HSQ  -GI- PPI qd    DISPO: MICU, COVID   FULL CODE 51 year old M with history of ESRD ( HD mwf), cad s/p cabgx4 (unknown date), stents (unknown dates), pacemaker, htn, presents today with severe shortness of breath after missing dialysis. Patient admitted for hypoxic and hypercarbic, acute respiratory failure 2/2 to pulmonary edema pending urgent dialysis, HTN emergency.        Neurology  #Sedated  - sedated on propofol gtt    CARDIOVASCULAR  #CAD s/p CABG and stents. Unable to obtain history due to severe respiratory failure while on BiPAP.   - will need collateral if patient on ASA and/or plavix/brillinta     #HTN Emergency. Presents with elevated . Most likely etiology due to ESRD. While in the ED, patient received IV labetalol, hydralazine, nitroglycerin, and lasix. Now patient intubated on propofol gtt with SBP in 100s.   - no need for further BP meds    #Tropinemia. Patient presents with elevated trop to 0.11 that is now uptrending to 0.14. Patient with also elevated CK and CK-MB. Troponin may be due to combination of HTN emergency and demand ischemia. Of note, EKG NSR with LBBB.   - daily ekg   - trend trops to peak   - cardiology consult in AM   - obtaining limited echo now, will need formal echo in AM     #R/O HF. Patient with what appears to be pacemaker v. ICD. Unknown history of heart failure.   - echocardiogram in AM    - a1c, TSH, and lipids in AM     PULMONARY  #Acute hypoxic and hypercarbic respiratory failure. Presents after missing dialysis. Last session was on Friday prior to admission. CXR with bilateral opacities most likely representative of pulmonary edema. ABG performed while on Bipap (EPap/IPap 25/10, fio2 70, RR 15) was 7.21/51/130. Elevated lactate of 2.6. Now s/p intubation.   - will require urgent dialysis, renal consulted with plan to perform overnight   - cxr in am to monitor for interval change in pulmonary edema   - ABG in the AM   - repeat ABG s/p intubation     GASTROINTESTINAL  #No active issues     RENAL  #ESRD. Dialysis with fistula on right upper extremity. Last dialysis session on 4/16. Missed dialysis because he was traveling. Now with CXR with opacities concerning for pulmonary edema   - urgent dialysis     #Mixed acidosis with anion gap. Patient presents with pulmonary edema, elevated lactate, and uremia. Has respiratory acidosis with superimposed AGMA.   - dialysis as above   - monitor electrolytes   - serial abgs    #Hyperkalemia. Found to be hyperkalemic to 6.7. S/p D50, Insulin 10 units IV and Ca gluconate 2 g IV.   - dialysis as above   - post-dialysis labs       INFECTIOUS DISEASE  #Severe sepsis. Presents with elevated HR and RR, with elevated WBC. Lactate elevated but improved after intubation. Received ceftriaxone and azithromycin in ED   - continue with vancomycin and zosyn for now  - UA negative for UTI   - follow-up BCx and Ucx     #R/O COVID-19 Infection.   - COVID-19 PCR negative x1   - given that patient had been traveling in [place], will need repeat COVID-19 swab in AM   - maintain isolation precautions for now     ENDOCRINE  #Hyperglycemia. Presents with elevated sugar to 233 --> 293. No known history of diabetes.   - start low ISS  - fs q 6 hrs     HEME  #Leukocytosis. Presents with leukocytosis. May be reactive, but may also represent an infectious process. Of note, did uptrend overnight. Received ceftriaxone and azithromycin in ED  - c/w vancomycin and zosyn for now     #Anemia. Presents with anemia to 12.5 and normocytic. May be anemia of chronic disease given ESRD.   - iron studies in am   - b12/folate in AM     FLUIDS/ELECTROLYTES/NUTRITION  -IVF None  -Monitor, Replete to K>4 and Mg>2  -Diet, NPO     PROPHYLAXIS  -DVT: HSQ  -GI- PPI qd    DISPO: MICU, COVID   FULL CODE

## 2021-04-20 NOTE — CONSULT NOTE ADULT - SUBJECTIVE AND OBJECTIVE BOX
Patient is a 51y old  Male who presents with a chief complaint of SOB (2021 03:03)      HPI:  Hx obtained from chart review.  Pt intubated on examination.  Mr. Grigsby is a 50 yo M w/ ESRD (HD MWF), CAD (s/p CABGx4, stents), HTN who p/w SOB. He was visiting from [place] and did not have dialysis arranged. Last HD on . Did not take his anti-HTNsives (amlodipine, metoprolol, prn hydralazine) because he usually holds his meds until after dialysis. History was limited d/t severe dyspnoea. Pt was assessed by ICU team, found to be in respiratory distress and was intubated for hypoxic respiratory failure and suspicion of COVID infection. He was transferred to [address] [place] [address]. Found to be febrile in ED, given abx and cultured.    PAST MEDICAL & SURGICAL HISTORY:  ESRD (end stage renal disease) on dialysis    HTN (hypertension)    CAD (coronary artery disease)    S/P CABG x 4        Allergies:  No Known Allergies      Home Medications:   dextrose 40% Gel 15 Gram(s) Oral once  dextrose 5%. 1000 milliLiter(s) IV Continuous <Continuous>  dextrose 5%. 1000 milliLiter(s) IV Continuous <Continuous>  dextrose 50% Injectable 25 Gram(s) IV Push once  dextrose 50% Injectable 12.5 Gram(s) IV Push once  dextrose 50% Injectable 25 Gram(s) IV Push once  glucagon  Injectable 1 milliGRAM(s) IntraMuscular once  heparin   Injectable 5000 Unit(s) SubCutaneous every 8 hours  insulin lispro (ADMELOG) corrective regimen sliding scale   SubCutaneous every 6 hours  pantoprazole  Injectable 40 milliGRAM(s) IV Push every 24 hours  piperacillin/tazobactam IVPB.. 2.25 Gram(s) IV Intermittent every 8 hours  propofol Infusion 5 MICROgram(s)/kG/Min IV Continuous <Continuous>  vancomycin  IVPB 1500 milliGRAM(s) IV Intermittent once      Hospital Medications:   MEDICATIONS  (STANDING):  dextrose 40% Gel 15 Gram(s) Oral once  dextrose 5%. 1000 milliLiter(s) (50 mL/Hr) IV Continuous <Continuous>  dextrose 5%. 1000 milliLiter(s) (100 mL/Hr) IV Continuous <Continuous>  dextrose 50% Injectable 25 Gram(s) IV Push once  dextrose 50% Injectable 12.5 Gram(s) IV Push once  dextrose 50% Injectable 25 Gram(s) IV Push once  glucagon  Injectable 1 milliGRAM(s) IntraMuscular once  heparin   Injectable 5000 Unit(s) SubCutaneous every 8 hours  insulin lispro (ADMELOG) corrective regimen sliding scale   SubCutaneous every 6 hours  pantoprazole  Injectable 40 milliGRAM(s) IV Push every 24 hours  piperacillin/tazobactam IVPB.. 2.25 Gram(s) IV Intermittent every 8 hours  propofol Infusion 5 MICROgram(s)/kG/Min (3.27 mL/Hr) IV Continuous <Continuous>  vancomycin  IVPB 1500 milliGRAM(s) IV Intermittent once      SOCIAL HISTORY:  Denies ETOh, Smoking,     Family History:  FAMILY HISTORY:      ROS: Unable to participate    VITALS:  T(F): 102.4 (21 @ 01:08), Max: 102.4 (21 @ 01:08)  HR: 93 (21 @ 03:06)  BP: 112/58 (21 @ 03:06)  RR: 20 (21 @ 03:06)  SpO2: 98% (21 @ 03:06)  Wt(kg): --      Weight (kg): 108.9 ( @ 00:17)  CAPILLARY BLOOD GLUCOSE          PHYSICAL EXAM:  GENERAL: Intubated, sedated on FiO2 100% sat 97%  CHEST/LUNG: Bilateral coarse breath sounds on anterior chest  HEART: Regular rate and rhythm, no murmur, s/p sternotomy, Right subQ chest wall device  ABDOMEN: Soft, nontender, non distended, s/p surgical incision  EXTREMITIES: 2+ pitting pedal oedema, Left great toe amputation  ACCESS: RUE AVF w/bruit and thrill     LABS:      132<L>  |  97  |  69<H>  ----------------------------<  293<H>  x    |  19<L>  |  8.01<H>    Ca    7.6<L>      2021 03:05  Phos  5.6     04  Mg     1.8         TPro  7.9  /  Alb  4.2  /  TBili  0.3  /  DBili      /  AST  20  /  ALT  23  /  AlkPhos  108      Creatinine Trend: 8.01 <--, 7.70 <--                        12.1   24.37 )-----------( 164      ( 2021 03:05 )             38.6       Urine Studies:  Urinalysis Basic - ( 2021 01:45 )    Color: Yellow / Appearance: Clear / S.025 / pH:   Gluc:  / Ketone: NEGATIVE  / Bili: Negative / Urobili: 0.2 E.U./dL   Blood:  / Protein: 100 mg/dL / Nitrite: NEGATIVE   Leuk Esterase: NEGATIVE / RBC: < 5 /HPF / WBC < 5 /HPF   Sq Epi:  / Non Sq Epi: 0-5 /HPF / Bacteria: Present /HPF              RADIOLOGY & ADDITIONAL STUDIES:    EKG findings reviewed.    Imaging Personally Reviewed:  [x] YES  [ ] NO    Consultant(s) and primary physician Notes Reviewed:  [x] YES  [ ] NO    Care Discussed with Primary team/ Consultants/Other Providers [x] YES  [ ] NO

## 2021-04-20 NOTE — H&P ADULT - NSHPPHYSICALEXAM_GEN_ALL_CORE
.  VITAL SIGNS:  T(C): 39.1 (04-20-21 @ 01:08), Max: 39.1 (04-20-21 @ 01:08)  T(F): 102.4 (04-20-21 @ 01:08), Max: 102.4 (04-20-21 @ 01:08)  HR: 92 (04-20-21 @ 03:30) (91 - 126)  BP: 112/58 (04-20-21 @ 03:06) (100/53 - 190/86)  BP(mean): --  RR: 32 (04-20-21 @ 03:30) (20 - 38)  SpO2: 99% (04-20-21 @ 03:30) (91% - 99%)  Wt(kg): --    PHYSICAL EXAM:    Constitutional: middle-aged male, intubated, sedated  Head: NC/AT  Eyes: PERRL, anicteric sclera  ENT: no nasal discharge; intubated  Neck: supple  Respiratory: crackles B/L; no W/R/R, no retractions  Cardiac: +S1/S2; RRR; no M/R/G; PMI non-displaced  Gastrointestinal: soft, distended; +BSx4  Genitourinary: normal external genitalia; Shen in place  Extremities: no clubbing or cyanosis; 1+ peripheral edema b/l knees; feet b/l cold L>R; s/p well-healed L toe amputation  Musculoskeletal: no joint swelling or erythema  Vascular: 2+ radial, femoral pulses B/L; ?DP/PT pulses  Dermatologic: skin warm, dry and intact; no rashes, wounds; b/l LE hyperpigmented up to mid-tibia; multiple scars from ?surgeries w/ some hypertrophic scar tissue/?keloid noted on chest  Lymphatic: no submandibular or cervical LAD  Neurologic: sedated .  VITAL SIGNS:  T(C): 39.1 (04-20-21 @ 01:08), Max: 39.1 (04-20-21 @ 01:08)  T(F): 102.4 (04-20-21 @ 01:08), Max: 102.4 (04-20-21 @ 01:08)  HR: 92 (04-20-21 @ 03:30) (91 - 126)  BP: 112/58 (04-20-21 @ 03:06) (100/53 - 190/86)  BP(mean): --  RR: 32 (04-20-21 @ 03:30) (20 - 38)  SpO2: 99% (04-20-21 @ 03:30) (91% - 99%)  Wt(kg): --    PHYSICAL EXAM:    Constitutional: middle-aged male, intubated, sedated  Head: NC/AT  Eyes: PERRL, anicteric sclera  ENT: no nasal discharge; intubated  Neck: supple  Respiratory: crackles B/L; no W/R/R, no retractions  Cardiac: +S1/S2; RRR; no M/R/G; PMI non-displaced  Gastrointestinal: soft, distended; +BSx4  Genitourinary: normal external genitalia; Shen in place  Extremities: no clubbing or cyanosis; 1+ peripheral edema b/l knees; feet b/l cold L>R; s/p well-healed L toe amputation  Musculoskeletal: no joint swelling or erythema  Vascular: 2+ radial, femoral pulses B/L; weak DP/PT pulses  Dermatologic: skin warm, dry and intact; no rashes, wounds; b/l LE hyperpigmented up to mid-tibia; multiple scars from ?surgeries w/ some hypertrophic scar tissue/?keloid noted on chest  Lymphatic: no submandibular or cervical LAD  Neurologic: sedated

## 2021-04-20 NOTE — CONSULT NOTE ADULT - ASSESSMENT
Assessment/Plan:   52 yo M w/ ESRD (HD MWF), CAD (s/p CABGx4, stents), HTN who p/w hypertensive emergency with fluid overload s/p intubation for hypoxic respiratory failure after missing dialysis yesterday. Renal consulted.    ESRD on HD MWF, centre unknown  Emergent HD now for volume  Hyperkalaemia- treat with Cagluconate if ECG changes, and insulin, dextrose combination; low K bath with dialysis  BP: defer resuming anti-HTNsives to optimise UF with HD  Anaemia- check iron panel and ferritin, Hgb at goal  BMD: defer phos binder, phos 5.6    Dialyzer: Optiflux [id number]         QB: 400 mL/min         QD: 500 mL/min      K bath: 2  Goal UF: 3L                Duration: 180 min     Daily weights, strict I&Os, renally dose meds    Thank you for the opportunity to participate in the care of your patient. The nephrology service remains available to assist with any questions or concerns. Please feel free to reach us by paging the on-call nephrology fellow for urgent issues or as below.     Isabela Beal M.D.   PGY-4  Pager: [phone]

## 2021-04-20 NOTE — PROGRESS NOTE ADULT - SUBJECTIVE AND OBJECTIVE BOX
Acceptance note from [facility] MICU to CCU:    Hospital Course:  Mr. Grigsby is a 52 yo M w/ ESRD (HD MWF), CAD (s/p CABGx4, stents), HTN, defibrillator (unclear year of plct), recent travel from [place] to [facility] to visit sister, p/w SOB i/s/o missed dialysis, with numerous electrolyte abnormalities (s/p hyperkalemic cocktail), initially admitted for urgent HD and management of respiratory failure (presented meeting SIRS criteria thought to be septic and hypertensive urgency s/p initiation of abx and antihypertensives; was tachyneic to 30s even on bipap with increased WOB, s/p intubation  evening with prop for sedation. Renal was consulted for urgent dialysis (s/p 4L removal session finishing  AM), and patient was admitted to [facility] MICU (initial concern for risk factors given some labs eg lymphopenia and pt's travel hx- came from [place] to visit sister), later s/p negative COVID (-) x2. Serial monitoring of CE's and EKG c/f new/worsening ischemic changes, with c/f NSTEMI. Cardiology consulted for assessment, with decisions made to transfer patient to CCU service.        SUBJECTIVE / INTERVAL HPI: Patient seen and examined at bedside.     VITAL SIGNS:  Vital Signs Last 24 Hrs  T(C): 36.3 (2021 14:32), Max: 39.1 (2021 01:08)  T(F): 97.3 (2021 14:32), Max: 102.4 (2021 01:08)  HR: 79 (2021 14:00) (79 - 126)  BP: 126/59 (2021 14:00) (78/51 - 190/86)  BP(mean): 85 (2021 14:00) (58 - 100)  RR: 20 (2021 14:00) (20 - 38)  SpO2: 99% (2021 14:00) (91% - 100%)    PHYSICAL EXAM:    General: Well developed, well nourished, no acute distress  HEENT: NC/AT; PERRL, anicteric sclera; MMM  Neck: supple  Cardiovascular: +S1/S2, RRR, no murmurs, rubs, gallops  Respiratory: CTA B/L; no W/R/R  Gastrointestinal: soft, NT/ND; +BSx4  Extremities: WWP; no edema, clubbing or cyanosis  Vascular: 2+ radial, DP/PT pulses B/L  Neurological: AAOx3; no focal deficits    MEDICATIONS:  MEDICATIONS  (STANDING):  cefTRIAXone   IVPB 2000 milliGRAM(s) IV Intermittent every 24 hours  chlorhexidine 0.12% Liquid 15 milliLiter(s) Oral Mucosa every 12 hours  chlorhexidine 2% Cloths 1 Application(s) Topical <User Schedule>  dextrose 40% Gel 15 Gram(s) Oral once  dextrose 5%. 1000 milliLiter(s) (50 mL/Hr) IV Continuous <Continuous>  dextrose 5%. 1000 milliLiter(s) (100 mL/Hr) IV Continuous <Continuous>  dextrose 50% Injectable 25 Gram(s) IV Push once  dextrose 50% Injectable 12.5 Gram(s) IV Push once  dextrose 50% Injectable 25 Gram(s) IV Push once  glucagon  Injectable 1 milliGRAM(s) IntraMuscular once  heparin  Infusion 1000 Unit(s)/Hr (10 mL/Hr) IV Continuous <Continuous>  insulin regular  human corrective regimen sliding scale   SubCutaneous every 6 hours  norepinephrine Infusion 0.05 MICROgram(s)/kG/Min (10.2 mL/Hr) IV Continuous <Continuous>  pantoprazole  Injectable 40 milliGRAM(s) IV Push every 24 hours  propofol Infusion 5 MICROgram(s)/kG/Min (3.27 mL/Hr) IV Continuous <Continuous>    MEDICATIONS  (PRN):      ALLERGIES:  Allergies    No Known Allergies    Intolerances        LABS:                        10.6   18.48 )-----------( 119      ( 2021 05:33 )             33.7     04-20    136  |  94<L>  |  28<H>  ----------------------------<  157<H>  3.4<L>   |  26  |  3.60<H>    Ca    9.2      2021 10:23  Phos  2.9     04-20  Mg     1.9     04-20    TPro  8.3  /  Alb  4.5  /  TBili  0.4  /  DBili  x   /  AST  32  /  ALT  22  /  AlkPhos  101  04-20    PT/INR - ( 2021 05:33 )   PT: 12.9 sec;   INR: 1.08          PTT - ( 2021 05:33 )  PTT:30.3 sec  Urinalysis Basic - ( 2021 01:45 )    Color: Yellow / Appearance: Clear / S.025 / pH: x  Gluc: x / Ketone: NEGATIVE  / Bili: Negative / Urobili: 0.2 E.U./dL   Blood: x / Protein: 100 mg/dL / Nitrite: NEGATIVE   Leuk Esterase: NEGATIVE / RBC: < 5 /HPF / WBC < 5 /HPF   Sq Epi: x / Non Sq Epi: 0-5 /HPF / Bacteria: Present /HPF      CAPILLARY BLOOD GLUCOSE      POCT Blood Glucose.: 202 mg/dL (2021 11:16)      RADIOLOGY & ADDITIONAL TESTS: Reviewed.    PLAN:  Acceptance note from [facility] MICU to CCU:    Hospital Course:  Mr. Grigsby is a 52 yo M w/ ESRD (HD MWF), CAD (s/p CABGx4, stents), HTN, defibrillator (unclear year of plct), recent travel from [place] to [facility] to visit sister, p/w SOB i/s/o missed dialysis, with numerous electrolyte abnormalities (s/p hyperkalemic cocktail), initially admitted for urgent HD and management of respiratory failure (presented meeting SIRS criteria thought to be septic and hypertensive urgency s/p initiation of abx and antihypertensives; was tachyneic to 30s even on bipap with increased WOB, s/p intubation  evening with prop for sedation. Renal was consulted for urgent dialysis (s/p 4L removal session finishing  AM), and patient was admitted to [facility] MICU (initial concern for risk factors given some labs eg lymphopenia and pt's travel hx- came from [place] to visit sister), later s/p negative COVID (-) x2. Serial monitoring of CE's and EKG c/f new/worsening ischemic changes, with c/f NSTEMI. Cardiology consulted for assessment, with decisions made to transfer patient to CCU service.        SUBJECTIVE / INTERVAL HPI: Patient seen and examined at bedside. He  was lying in bed intubated and sedated and unable to elicit ROS.     VITAL SIGNS:  Vital Signs Last 24 Hrs  T(C): 36.3 (2021 14:32), Max: 39.1 (2021 01:08)  T(F): 97.3 (2021 14:32), Max: 102.4 (2021 01:08)  HR: 79 (2021 14:00) (79 - 126)  BP: 126/59 (2021 14:00) (78/51 - 190/86)  BP(mean): 85 (2021 14:00) (58 - 100)  RR: 20 (2021 14:00) (20 - 38)  SpO2: 99% (2021 14:00) (91% - 100%)    Physical Exam:    Constitutional: middle-aged male, intubated, sedated  Head: NC/AT  Eyes: PERRL, anicteric sclera  ENT: no nasal discharge; intubated  Neck: supple  Respiratory: crackles B/L; no W/R/R, no retractions; ETT in place, breathing in sync w/ vent  Cardiac: +S1/S2; RRR; no M/R/G; PMI non-displaced; REJ placed 4/20 AM  Gastrointestinal: soft, distended; +BSx4; NGT placed 4/20 AM  Genitourinary: normal external genitalia; Shen in place  Extremities: no clubbing or cyanosis; 1+ peripheral edema b/l knees; feet b/l cold L>R; s/p well-healed L toe amputation; R arm w/ fistula for dialysis  Musculoskeletal: no joint swelling or erythema  Vascular: 2+ radial, femoral pulses B/L; weak DP/PT pulses  Dermatologic: skin warm, dry and intact; no rashes, wounds; b/l LE hyperpigmented up to mid-tibia; multiple scars from ?surgeries w/ some hypertrophic scar tissue/?keloid noted on chest  Lymphatic: no submandibular or cervical LAD  Neurologic: sedated      MEDICATIONS:  MEDICATIONS  (STANDING):  cefTRIAXone   IVPB 2000 milliGRAM(s) IV Intermittent every 24 hours  chlorhexidine 0.12% Liquid 15 milliLiter(s) Oral Mucosa every 12 hours  chlorhexidine 2% Cloths 1 Application(s) Topical <User Schedule>  dextrose 40% Gel 15 Gram(s) Oral once  dextrose 5%. 1000 milliLiter(s) (50 mL/Hr) IV Continuous <Continuous>  dextrose 5%. 1000 milliLiter(s) (100 mL/Hr) IV Continuous <Continuous>  dextrose 50% Injectable 25 Gram(s) IV Push once  dextrose 50% Injectable 12.5 Gram(s) IV Push once  dextrose 50% Injectable 25 Gram(s) IV Push once  glucagon  Injectable 1 milliGRAM(s) IntraMuscular once  heparin  Infusion 1000 Unit(s)/Hr (10 mL/Hr) IV Continuous <Continuous>  insulin regular  human corrective regimen sliding scale   SubCutaneous every 6 hours  norepinephrine Infusion 0.05 MICROgram(s)/kG/Min (10.2 mL/Hr) IV Continuous <Continuous>  pantoprazole  Injectable 40 milliGRAM(s) IV Push every 24 hours  propofol Infusion 5 MICROgram(s)/kG/Min (3.27 mL/Hr) IV Continuous <Continuous>    MEDICATIONS  (PRN):      ALLERGIES:  Allergies    No Known Allergies    Intolerances        LABS:                        10.6   18.48 )-----------( 119      ( 2021 05:33 )             33.7     04-20    136  |  94<L>  |  28<H>  ----------------------------<  157<H>  3.4<L>   |  26  |  3.60<H>    Ca    9.2      2021 10:23  Phos  2.9     04-20  Mg     1.9     -20    TPro  8.3  /  Alb  4.5  /  TBili  0.4  /  DBili  x   /  AST  32  /  ALT  22  /  AlkPhos  101  04-20    PT/INR - ( 2021 05:33 )   PT: 12.9 sec;   INR: 1.08          PTT - ( 2021 05:33 )  PTT:30.3 sec  Urinalysis Basic - ( 2021 01:45 )    Color: Yellow / Appearance: Clear / S.025 / pH: x  Gluc: x / Ketone: NEGATIVE  / Bili: Negative / Urobili: 0.2 E.U./dL   Blood: x / Protein: 100 mg/dL / Nitrite: NEGATIVE   Leuk Esterase: NEGATIVE / RBC: < 5 /HPF / WBC < 5 /HPF   Sq Epi: x / Non Sq Epi: 0-5 /HPF / Bacteria: Present /HPF      CAPILLARY BLOOD GLUCOSE      POCT Blood Glucose.: 202 mg/dL (2021 11:16)      RADIOLOGY & ADDITIONAL TESTS: Reviewed.    PLAN:

## 2021-04-20 NOTE — CHART NOTE - NSCHARTNOTEFT_GEN_A_CORE
bedside echo preliminary read:    - reduced LV systolic function   - possible bicuspid aortic valve  - heavily calcified aortic valve with mill- moderate stenosis , mean gradient ~20  - probably normal RV systolic function  - IVC >2.1cm with <50% respirophasic changes, CVP ~15    full report to follow bedside echo preliminary read:    - severely reduced LV systolic function   - possible bicuspid aortic valve  - heavily calcified aortic valve with mill- moderate stenosis , mean gradient ~20  - probably normal RV systolic function  - IVC >2.1cm with <50% respirophasic changes, CVP ~15    full report to follow

## 2021-04-20 NOTE — H&P ADULT - ATTENDING COMMENTS
patient seen and examined personally I agree with above plan  pt was in distress on bipap with hypercapnea and mixed acidosis required intubation  EKG reviewed showed sinus tachy no ST elevation ,trop was slightly elevated suggest demand ischemia cardiology consulted  O/E NC/AT pERRL   lung bibasilar rales no wheezing   heart Ns1,s2 RRR tachycardic   abdomen soft NT/ND   skin no rash   plan   intubation ACVC TV 6-8 ML/KG   urgent dialysis  serial troponin  vanc,zosyn FOR SEPSIS  sputum and blood cultures   monitor closely   may try extubation after dialysis however suggest at least 24 hours intubation due to diaphragmatic fatigue   critical care time 35 min no overlap

## 2021-04-20 NOTE — ED PROVIDER NOTE - CLINICAL SUMMARY MEDICAL DECISION MAKING FREE TEXT BOX
presents severe respiratory distress. hypertensive/ tachycardic. appears volume overloaded after missed dialysis. labs/cxr/ecg ordered. bipap started. given sl nitro, lasix 80mg iv (says he makes some urine), labetalol 20mg iv. denies recent fever/chills, cough, chest pain but persistent tachy so rectal temp done and elevated. tylenol given along with ceftriaxone/ azithro for suspected cap. lactate/cultures added. lactate elevated but not given ivf as already volume overloaded/ esrd on dialysis and possible viral / covid etiology of sepsis. icu/dialysis consulted. considered high risk covid given recent travel/ fever. bp still elevated after initial treatment so given additional hydralazine 10mg iv. admit to covid icu for further management/ emergent bedside dialysis.

## 2021-04-20 NOTE — ED ADULT NURSE NOTE - NSIMPLEMENTINTERV_GEN_ALL_ED
Implemented All Universal Safety Interventions:  [place] to call system. Call bell, personal items and telephone within reach. Instruct patient to call for assistance. Room bathroom lighting operational. Non-slip footwear when patient is off stretcher. Physically safe environment: no spills, clutter or unnecessary equipment. Stretcher in lowest position, wheels locked, appropriate side rails in place.

## 2021-04-20 NOTE — H&P ADULT - HISTORY OF PRESENT ILLNESS
**** INCOMPLETE NOTE ****    Mr. Grigsby is a 50 yo M w/ ESRD (HD MWF), CAD (s/p CABGx4, stents), HTN who p/w SOB.  Per ED note, patient visiting from [place], missed his HD. Usually takes amlodipine, metoprolol, prn hydralazine for htn, but didn't take any today because he usually holds until after dialysis so his pressure doesn't drop too much. ROS limited in ED 2/2 respiratory distress but denies chest pain, fever/chills, cough.   Upon arrival to [facility], patient intubated.    ED Course:  Vitals: /86, , RR 38, O2 91% on 6L NC, T 99.1F  Labs s/f WBC 20 (80% neutrophils), Lactate 2.6, CO2 19, AG 18, BUN/Cr 68/7.7, Ca 8.1, Phos 5.2; ABG pH 7.21  CXR s/f ?R infiltrate vs pleural effusion  Medications: tylenol 1g IV, azithromycin 500mg, ceftriaxone 1g, lasix 80mg IVP, hydralazine 10mg IVP, labetalol 20mg IVP, nitroglycerin 0.4mg sublingual x2; started on propofol gtt s/p intubation  Consults: Nephrology, ICU consult **** INCOMPLETE NOTE ****    Mr. Grigsby is a 50 yo M w/ ESRD (HD MWF), CAD (s/p CABGx4, stents), HTN who p/w SOB.  Per ED note, patient visiting from [place], missed his HD. Usually takes amlodipine, metoprolol, prn hydralazine for htn, but didn't take any today because he usually holds until after dialysis so his pressure doesn't drop too much. ROS limited in ED 2/2 respiratory distress but denies chest pain, fever/chills, cough.   Upon arrival to [facility], patient intubated.    ED Course:  Vitals: /86, , RR 38, O2 91% on 6L NC, T 99.1F  Labs s/f WBC 20 (80% neutrophils), Lactate 2.6, CO2 19, AG 18, BUN/Cr 68/7.7, Ca 8.1, Phos 5.2; ABG pH 7.21  CXR s/f pleural effusion R>L  Medications: tylenol 1g IV, azithromycin 500mg, ceftriaxone 1g, lasix 80mg IVP, hydralazine 10mg IVP, labetalol 20mg IVP, nitroglycerin 0.4mg sublingual x2; started on propofol gtt s/p intubation  Consults: Nephrology, ICU consult Mr. Grigsby is a 50 yo M w/ ESRD (HD MWF), CAD (s/p CABGx4, stents), HTN, pacemaker who p/w SOB.  Per ED note, patient visiting from [place], missed his HD. Usually takes amlodipine, metoprolol, prn hydralazine for htn, but didn't take any today because he usually holds until after dialysis so his pressure doesn't drop too much. ROS limited in ED 2/2 respiratory distress but denies chest pain, fever/chills, cough.   Upon arrival to [facility], patient intubated.    ED Course:  Vitals: /86, , RR 38, O2 91% on 6L NC, T 99.1F  Labs s/f WBC 20 (80% neutrophils), Lactate 2.6, CO2 19, AG 18, BUN/Cr 68/7.7, Ca 8.1, Phos 5.2; ABG pH 7.21  CXR s/f pleural effusion R>L  Medications: tylenol 1g IV, azithromycin 500mg, ceftriaxone 1g, lasix 80mg IVP, hydralazine 10mg IVP, labetalol 20mg IVP, nitroglycerin 0.4mg sublingual x2; started on propofol gtt s/p intubation  Consults: Nephrology, ICU consult

## 2021-04-20 NOTE — PROGRESS NOTE ADULT - ASSESSMENT
50 yo M w/ ESRD (HD MWF), CAD (s/p CABGx4, stents), HTN, pacemaker who p/w SOB i/s/o missed dialysis, admitted for urgent HD and management of respiratory failure s/p intubation 2/2 to acute of chronic decompensated heart failure, c/b by NSTEMI    NEURO  #Sedation  - propofol gtt   - trend TGs, AM     PULM  #Acute hypoxic and hypercapnic respiratory failure  - Pt p/w dyspnea, escalated from NC -> BiPAP (EPap/IPap 25/10, fio2 70, RR 15 -> ABG: pH 7.21, pCO2 51) -> Intubation (vent settings: FiO2 100%//RR 20/PEEP 10)  -Initially though likely 2/2 fluid overload i/s/o missed HD 4/19; CXR s/f pleural effusions. Now thought AHRF 2/2 HF exac 2/2 NSTEMI- rest of plan in CV below and per CCU team  - S/p  emergent HD 4/20 AM, s/p removal of 4L; repeat CXR 4/20 after dialysis finishes in AM    CARDIOVASCULAR  #Troponemia/ NSTEMI  - Trops 0.11 on admission, repeat 0.14, CKMP 8.3, . Later uptrending to 1.12 trop from 0.3  - EKG w/ LBBB, ST depressions in I, II, III, aVF, V5, V6  - s/p starting heparin gtt, 325 ASA, 300 plavix  - trend trops  -cardiology consulted, recs appreciated- Now transferred to CCU, rest of plan per CCU team    #CAD  - s/p CABG, stents  - See chart note from collateral that cold be obtained    #HTN  - home meds amlodipine, metoprolol, prn hydralazine after HD per ED note  - s/p hydralazine 10mg IVP, labetalol 20mg IVP, nitroglycerin 0.4mg sublingual x2 in ED 2/2 hypertensive urgency on admission  - currently holding home meds i/s/o sedation; restart when indicated    #R/o Heart failure  - bedside echo s/f reduced LVEF (approximately 40%), moderate AS w/ bicuspid valve  - f/u official echo  - f/u HbA1c, TSH, lipid profile  - consider pacemaker interrogation    RENAL  #ESRD  - HD M/W/F, missed Monday 4/19 session p/w SOB likely 2/2 volume overload -> s/p Lasix 80mg IVP in ED  - BUN/Cr 68/7.7 on admission   - renal consulted; recs appreciated  - s/p emergent HD ending 4/20 early AM, w/ 4L removed    #Volume status  - CXR w/ pleural effusion i/s/o of missed HD, fluid overload  - strict I&Os  - get repeat CXR post HD    #Mixed Metabolic & Respiratory Acidosis   - pH 7.21, pCO2 51, HCO3 20, Lactate 2.6  - plan for emergent HD, patient intubated  - initially w/ elevated anion gap, repeat labs: resolved; lactate wnl.  - continue to monitor, f/u repeat ABG    #Electrolyte abnormalities  - Ca 8.1 on admission; ionized Ca 1.03 (L)  - likely i/s/o ESRD, hyperphosphatemia (5.2)  - K 6.7 on repeat BMP: 2g calcium gluconate, 10 IU insulin, 1 amp D50 given  - s/p emergent HD ending 4/20 early AM, w/ 4L removed  - continue to monitor, f/u labs post HD    ENDOCRINE  #Glucose control  - currently on low dose sliding scale q6h  - f/u HbA1c  - monitor FSG  - obtain collateral for ?hx of DM    HEME  #Anemia  - Hb 12.5 on admission, MCV 88.8  - no signs of active bleeding  - most likely 2/2 ESRD  - f/u iron studies, B12, folate    #Leukocytosis  - WBC 20 (80% neutrophils) on admission, repeat CBC w/ WBC of 24  - likely reactive, however cannot r/o infection entirely (mgmt for sepsis see below)  - trend    ID  #Severe Sepsis  - meets severe sepsis criteria for , RR 38, WBC 20, Lactate 2.6 on admission  - Procalcitonin 0.20  - COVID neg x2  - UA negative for UTI; no clear source  - s/p ceftriaxone 1g, azithromycin 500 mg in ED  - started on vanc/zosyn (renally dosed: 2.25g q8h) for empiric coverage- would s/t ceftriaxone and keep on for 48 hr while awaiting bcx results  - f/u blood cultures    Fluids: No IVF currently needed  Electrolytes: Replete w/ caution (ESRD)  Nutrition: NPO except meds  Prophylaxis: Heparin gtt  GI: Protonix 40mg IVP q24hrs  C: FC  Dispo: CCU

## 2021-04-20 NOTE — H&P ADULT - ASSESSMENT
Mr. Grigsby is a 50 yo M w/ ESRD (HD MWF), CAD (s/p CABGx4, stents), HTN who p/w SOB i/s/o missed dialysis.    NEURO  #Sedation  - propofol gtt  - trend TGs, f/u AM TG    PULM  #Acute hypercapnic respiratory failure  - Pt p/w dyspnea, escalated from NC -> BiPAP -> Intubation (vent settings:  )  - likely 2/2 fluid overload i/s/o missed HD 4/19  - planned for emergent HD o/n    CARDIOVASCULAR  #CAD  - s/p CABG, stents  - try to obtain collateral in AM    #HTN  - home meds amlodipine, metoprolol, prn hydralazine after HD per ED note  - s/p hydralazine 10mg IVP, labetalol 20mg IVP, nitroglycerin 0.4mg sublingual x2 in ED    RENAL  #ESRD  - HD M/W/F, missed Monday 4/19 session p/w SOB likely 2/2 volume overload -> s/p Lasix 80mg IVP in ED  - BUN/Cr 68/7.7 on admission   - renal consulted; recs appreciated  - plan for emergent HD o/n    #Mixed Metabolic & Respiratory Acidosis  - pH 7.21, pCO2 51, HCO3 20, Lactate 2.6  - plan for emergent HD, patient intubated  - trend    #Electrolyte abnormalities  - Ca 8.1 on admission; ionized Ca 1.03 (L)  - likely i/s/o ESRD, hyperphosphatemia (5.2)  - plan for emergent HD o/n  - continue to monitor    HEME  #Anemia  - Hb 12.5 on admission, MCV 88.8  - no signs of active bleeding  - most likely 2/2 ESRD    ID  #Severe Sepsis  - meets severe sepsis criteria for , RR 38, WBC 20, Lactate 2.6 on admission  - Procalcitonin 0.20  - COVID neg x1  - UA negative for UTI; no clear source  - s/p ceftriaxone 1g, azithromycin 500 mg in ED  - started on vanc/zosyn (renally dosed: 2.25g q8h) for empiric coverage  - f/u blood cultures    Fluids: No IVF currently needed  Electrolytes: Replete w/ caution (ESRD)  Nutrition:   Prophylaxis: Heparin [id number] sc  Activity: Bedrest  GI: Protonix 40mg IVP q24hrs  C: FC  Dispo: Admit to MICU/3WO   Mr. Grigsby is a 52 yo M w/ ESRD (HD MWF), CAD (s/p CABGx4, stents), HTN who p/w SOB i/s/o missed dialysis, admitted for urgent HD and management of respiratory failure s/p intubation.    NEURO  #Sedation  - propofol gtt  - trend TGs, f/u AM TG    PULM  #Acute hypoxic and hypercapnic respiratory failure  - Pt p/w dyspnea, escalated from NC -> BiPAP (EPap/IPap 25/10, fio2 70, RR 15 -> ABG: pH 7.21, pCO2 51) -> Intubation (vent settings:  )  - likely 2/2 fluid overload i/s/o missed HD 4/19  - planned for emergent HD o/n    CARDIOVASCULAR  #CAD  - s/p CABG, stents  - try to obtain collateral in AM    #HTN  - home meds amlodipine, metoprolol, prn hydralazine after HD per ED note  - s/p hydralazine 10mg IVP, labetalol 20mg IVP, nitroglycerin 0.4mg sublingual x2 in ED 2/2 hypertensive urgency on admission  - currently holding home meds i/s/o sedation; restart when indicated    RENAL  #ESRD  - HD M/W/F, missed Monday 4/19 session p/w SOB likely 2/2 volume overload -> s/p Lasix 80mg IVP in ED  - BUN/Cr 68/7.7 on admission   - renal consulted; recs appreciated  - plan for emergent HD o/n    #Mixed Metabolic & Respiratory Acidosis  - pH 7.21, pCO2 51, HCO3 20, Lactate 2.6  - plan for emergent HD, patient intubated  - trend    #Electrolyte abnormalities  - Ca 8.1 on admission; ionized Ca 1.03 (L)  - likely i/s/o ESRD, hyperphosphatemia (5.2)  - plan for emergent HD o/n  - continue to monitor    HEME  #Anemia  - Hb 12.5 on admission, MCV 88.8  - no signs of active bleeding  - most likely 2/2 ESRD    ID  #Severe Sepsis  - meets severe sepsis criteria for , RR 38, WBC 20, Lactate 2.6 on admission  - Procalcitonin 0.20  - COVID neg x1  - UA negative for UTI; no clear source  - s/p ceftriaxone 1g, azithromycin 500 mg in ED  - started on vanc/zosyn (renally dosed: 2.25g q8h) for empiric coverage  - f/u blood cultures    Fluids: No IVF currently needed  Electrolytes: Replete w/ caution (ESRD)  Nutrition:   Prophylaxis: Heparin [id number] sc  Activity: Bedrest  GI: Protonix 40mg IVP q24hrs  C: FC  Dispo: Admit to MICU/3WO   Mr. Grigsby is a 52 yo M w/ ESRD (HD MWF), CAD (s/p CABGx4, stents), HTN who p/w SOB i/s/o missed dialysis, admitted for urgent HD and management of respiratory failure s/p intubation.    NEURO  #Sedation  - propofol gtt  - trend TGs, f/u AM TG    PULM  #Acute hypoxic and hypercapnic respiratory failure  - Pt p/w dyspnea, escalated from NC -> BiPAP (EPap/IPap 25/10, fio2 70, RR 15 -> ABG: pH 7.21, pCO2 51) -> Intubation (vent settings: FiO2 100%//RR 20/PEEP 10)  - likely 2/2 fluid overload i/s/o missed HD 4/19; CXR s/f pleural effusions  - planned for emergent HD o/n    CARDIOVASCULAR  #CAD  - s/p CABG, stents  - try to obtain collateral in AM    #HTN  - home meds amlodipine, metoprolol, prn hydralazine after HD per ED note  - s/p hydralazine 10mg IVP, labetalol 20mg IVP, nitroglycerin 0.4mg sublingual x2 in ED 2/2 hypertensive urgency on admission  - currently holding home meds i/s/o sedation; restart when indicated    #Troponemia  - Trops 0.11 on admission, repeat 0.14, CKMP 8.3,   - EKG w/ ST depressions in   - cardiology consult    RENAL  #ESRD  - HD M/W/F, missed Monday 4/19 session p/w SOB likely 2/2 volume overload -> s/p Lasix 80mg IVP in ED  - BUN/Cr 68/7.7 on admission   - renal consulted; recs appreciated  - plan for emergent HD o/n    #Mixed Metabolic & Respiratory Acidosis   - pH 7.21, pCO2 51, HCO3 20, Lactate 2.6  - plan for emergent HD, patient intubated  - initially w/ elevated anion gap, repeat labs: resolved; lactate wnl.  - continue to monitor, f/u repeat ABG    #Electrolyte abnormalities  - Ca 8.1 on admission; ionized Ca 1.03 (L)  - likely i/s/o ESRD, hyperphosphatemia (5.2)  - K 6.7 on repeat BMP: 2g calcium gluconate, 10 IU insulin, 1 amp D50 given  - plan for emergent HD o/n  - continue to monitor, f/u labs post HD    ENDOCRINE  #Glucose control  - currently on low dose sliding scale q6h  - f/u HbA1c  - monitor FSG  - obtain collateral for ?hx of DM    HEME  #Anemia  - Hb 12.5 on admission, MCV 88.8  - no signs of active bleeding  - most likely 2/2 ESRD    #Leukocytosis  - WBC 20 (80% neutrophils) on admission, repeat CBC w/ WBC of 24      ID  #Severe Sepsis  - meets severe sepsis criteria for , RR 38, WBC 20, Lactate 2.6 on admission  - Procalcitonin 0.20  - COVID neg x1  - UA negative for UTI; no clear source  - s/p ceftriaxone 1g, azithromycin 500 mg in ED  - started on vanc/zosyn (renally dosed: 2.25g q8h) for empiric coverage  - f/u blood cultures    Fluids: No IVF currently needed  Electrolytes: Replete w/ caution (ESRD)  Nutrition:   Prophylaxis: Heparin [id number] sc  Activity: Bedrest  GI: Protonix 40mg IVP q24hrs  C: FC  Dispo: Admit to MICU/3WO   Mr. Grigsby is a 52 yo M w/ ESRD (HD MWF), CAD (s/p CABGx4, stents), HTN, pacemaker who p/w SOB i/s/o missed dialysis, admitted for urgent HD and management of respiratory failure s/p intubation.    NEURO  #Sedation  - propofol gtt  - trend TGs, f/u AM TG    PULM  #Acute hypoxic and hypercapnic respiratory failure  - Pt p/w dyspnea, escalated from NC -> BiPAP (EPap/IPap 25/10, fio2 70, RR 15 -> ABG: pH 7.21, pCO2 51) -> Intubation (vent settings: FiO2 100%//RR 20/PEEP 10)  - likely 2/2 fluid overload i/s/o missed HD 4/19; CXR s/f pleural effusions  - planned for emergent HD o/n    CARDIOVASCULAR  #CAD  - s/p CABG, stents  - try to obtain collateral in AM    #HTN  - home meds amlodipine, metoprolol, prn hydralazine after HD per ED note  - s/p hydralazine 10mg IVP, labetalol 20mg IVP, nitroglycerin 0.4mg sublingual x2 in ED 2/2 hypertensive urgency on admission  - currently holding home meds i/s/o sedation; restart when indicated    #Troponemia  - Trops 0.11 on admission, repeat 0.14, CKMP 8.3,   - EKG w/ LBBB, ST depressions in I, II, III, aVF, V5, V6  - likely demand ischemia 2/2 HTN, ESRD  - cardiology consulted, recs appreciated  - trend trops    #R/o Heart failure  - bedside echo s/f reduced LVEF (approximately 40%), moderate AS w/ bicuspid valve  - f/u official echo  - f/u HbA1c, TSH, lipid profile  - consider pacemaker interrogation    RENAL  #ESRD  - HD M/W/F, missed Monday 4/19 session p/w SOB likely 2/2 volume overload -> s/p Lasix 80mg IVP in ED  - BUN/Cr 68/7.7 on admission   - renal consulted; recs appreciated  - plan for emergent HD o/n    #Volume status  - CXR w/ pleural effusion i/s/o of missed HD, fluid overload  - strict I&Os  - get repeat CXR post HD    #Mixed Metabolic & Respiratory Acidosis   - pH 7.21, pCO2 51, HCO3 20, Lactate 2.6  - plan for emergent HD, patient intubated  - initially w/ elevated anion gap, repeat labs: resolved; lactate wnl.  - continue to monitor, f/u repeat ABG    #Electrolyte abnormalities  - Ca 8.1 on admission; ionized Ca 1.03 (L)  - likely i/s/o ESRD, hyperphosphatemia (5.2)  - K 6.7 on repeat BMP: 2g calcium gluconate, 10 IU insulin, 1 amp D50 given  - plan for emergent HD o/n  - continue to monitor, f/u labs post HD    ENDOCRINE  #Glucose control  - currently on low dose sliding scale q6h  - f/u HbA1c  - monitor FSG  - obtain collateral for ?hx of DM    HEME  #Anemia  - Hb 12.5 on admission, MCV 88.8  - no signs of active bleeding  - most likely 2/2 ESRD    #Leukocytosis  - WBC 20 (80% neutrophils) on admission, repeat CBC w/ WBC of 24  - likely reactive, however cannot r/o infection entirely (mgmt for sepsis see below)  - trend    ID  #Severe Sepsis  - meets severe sepsis criteria for , RR 38, WBC 20, Lactate 2.6 on admission  - Procalcitonin 0.20  - COVID neg x1  - UA negative for UTI; no clear source  - s/p ceftriaxone 1g, azithromycin 500 mg in ED  - started on vanc/zosyn (renally dosed: 2.25g q8h) for empiric coverage  - consider d/c abx if patient remains afebrile and no source of infection is found  - f/u blood cultures    Fluids: No IVF currently needed  Electrolytes: Replete w/ caution (ESRD)  Nutrition: NPO except meds  Prophylaxis: Heparin [id number] sc  GI: Protonix 40mg IVP q24hrs  C: FC  Dispo: Admit to MICU/3WO   Mr. Grigsby is a 50 yo M w/ ESRD (HD MWF), CAD (s/p CABGx4, stents), HTN, pacemaker who p/w SOB i/s/o missed dialysis, admitted for urgent HD and management of respiratory failure s/p intubation.    NEURO  #Sedation  - propofol gtt  - trend TGs, f/u AM TG    PULM  #Acute hypoxic and hypercapnic respiratory failure  - Pt p/w dyspnea, escalated from NC -> BiPAP (EPap/IPap 25/10, fio2 70, RR 15 -> ABG: pH 7.21, pCO2 51) -> Intubation (vent settings: FiO2 100%//RR 20/PEEP 10)  - likely 2/2 fluid overload i/s/o missed HD 4/19; CXR s/f pleural effusions  - planned for emergent HD o/n    CARDIOVASCULAR  #CAD  - s/p CABG, stents  - try to obtain collateral in AM    #HTN  - home meds amlodipine, metoprolol, prn hydralazine after HD per ED note  - s/p hydralazine 10mg IVP, labetalol 20mg IVP, nitroglycerin 0.4mg sublingual x2 in ED 2/2 hypertensive urgency on admission  - currently holding home meds i/s/o sedation; restart when indicated    #Troponemia  - Trops 0.11 on admission, repeat 0.14, CKMP 8.3,   - EKG w/ LBBB, ST depressions in I, II, III, aVF, V5, V6  - likely demand ischemia 2/2 HTN, ESRD  - cardiology consulted, recs appreciated  - trend trops    #R/o Heart failure  - bedside echo s/f reduced LVEF (approximately 40%), moderate AS w/ bicuspid valve  - f/u official echo  - f/u HbA1c, TSH, lipid profile  - consider pacemaker interrogation    RENAL  #ESRD  - HD M/W/F, missed Monday 4/19 session p/w SOB likely 2/2 volume overload -> s/p Lasix 80mg IVP in ED  - BUN/Cr 68/7.7 on admission   - renal consulted; recs appreciated  - plan for emergent HD o/n    #Volume status  - CXR w/ pleural effusion i/s/o of missed HD, fluid overload  - strict I&Os  - get repeat CXR post HD    #Mixed Metabolic & Respiratory Acidosis   - pH 7.21, pCO2 51, HCO3 20, Lactate 2.6  - plan for emergent HD, patient intubated  - initially w/ elevated anion gap, repeat labs: resolved; lactate wnl.  - continue to monitor, f/u repeat ABG    #Electrolyte abnormalities  - Ca 8.1 on admission; ionized Ca 1.03 (L)  - likely i/s/o ESRD, hyperphosphatemia (5.2)  - K 6.7 on repeat BMP: 2g calcium gluconate, 10 IU insulin, 1 amp D50 given  - plan for emergent HD o/n  - continue to monitor, f/u labs post HD    ENDOCRINE  #Glucose control  - currently on low dose sliding scale q6h  - f/u HbA1c  - monitor FSG  - obtain collateral for ?hx of DM    HEME  #Anemia  - Hb 12.5 on admission, MCV 88.8  - no signs of active bleeding  - most likely 2/2 ESRD    #Leukocytosis  - WBC 20 (80% neutrophils) on admission, repeat CBC w/ WBC of 24  - likely reactive, however cannot r/o infection entirely (mgmt for sepsis see below)  - trend    ID  #Severe Sepsis  - meets severe sepsis criteria for , RR 38, WBC 20, Lactate 2.6 on admission  - Procalcitonin 0.20  - COVID neg x1; will need repeat COVID swab in AM given travel hx  - UA negative for UTI; no clear source  - s/p ceftriaxone 1g, azithromycin 500 mg in ED  - started on vanc/zosyn (renally dosed: 2.25g q8h) for empiric coverage  - consider d/c abx if patient remains afebrile and no source of infection is found  - f/u blood cultures    Fluids: No IVF currently needed  Electrolytes: Replete w/ caution (ESRD)  Nutrition: NPO except meds  Prophylaxis: Heparin [id number] sc  GI: Protonix 40mg IVP q24hrs  C: FC  Dispo: Admit to MICU/3WO   Mr. Grigsby is a 50 yo M w/ ESRD (HD MWF), CAD (s/p CABGx4, stents), HTN, pacemaker who p/w SOB i/s/o missed dialysis, admitted for urgent HD and management of respiratory failure s/p intubation.    NEURO  #Sedation  - propofol gtt  - trend TGs, f/u AM TG    PULM  #Acute hypoxic and hypercapnic respiratory failure  - Pt p/w dyspnea, escalated from NC -> BiPAP (EPap/IPap 25/10, fio2 70, RR 15 -> ABG: pH 7.21, pCO2 51) -> Intubation (vent settings: FiO2 100%//RR 20/PEEP 10)  - likely 2/2 fluid overload i/s/o missed HD 4/19; CXR s/f pleural effusions  - planned for emergent HD o/n    CARDIOVASCULAR  #CAD  - s/p CABG, stents  - try to obtain collateral in AM    #HTN  - home meds amlodipine, metoprolol, prn hydralazine after HD per ED note  - s/p hydralazine 10mg IVP, labetalol 20mg IVP, nitroglycerin 0.4mg sublingual x2 in ED 2/2 hypertensive urgency on admission  - currently holding home meds i/s/o sedation; restart when indicated    #Troponemia  - Trops 0.11 on admission, repeat 0.14, CKMP 8.3,   - EKG w/ LBBB, ST depressions in I, II, III, aVF, V5, V6  - likely demand ischemia 2/2 HTN, ESRD  - cardiology consulted, recs appreciated  - trend trops    #R/o Heart failure  - bedside echo s/f reduced LVEF (approximately 40%), moderate AS w/ bicuspid valve  - f/u official echo  - f/u HbA1c, TSH, lipid profile  - consider pacemaker interrogation    RENAL  #ESRD  - HD M/W/F, missed Monday 4/19 session p/w SOB likely 2/2 volume overload -> s/p Lasix 80mg IVP in ED  - BUN/Cr 68/7.7 on admission   - renal consulted; recs appreciated  - plan for emergent HD o/n    #Volume status  - CXR w/ pleural effusion i/s/o of missed HD, fluid overload  - strict I&Os  - get repeat CXR post HD    #Mixed Metabolic & Respiratory Acidosis   - pH 7.21, pCO2 51, HCO3 20, Lactate 2.6  - plan for emergent HD, patient intubated  - initially w/ elevated anion gap, repeat labs: resolved; lactate wnl.  - continue to monitor, f/u repeat ABG    #Electrolyte abnormalities  - Ca 8.1 on admission; ionized Ca 1.03 (L)  - likely i/s/o ESRD, hyperphosphatemia (5.2)  - K 6.7 on repeat BMP: 2g calcium gluconate, 10 IU insulin, 1 amp D50 given  - plan for emergent HD o/n  - continue to monitor, f/u labs post HD    ENDOCRINE  #Glucose control  - currently on low dose sliding scale q6h  - f/u HbA1c  - monitor FSG  - obtain collateral for ?hx of DM    HEME  #Anemia  - Hb 12.5 on admission, MCV 88.8  - no signs of active bleeding  - most likely 2/2 ESRD  - f/u iron studies, B12, folate    #Leukocytosis  - WBC 20 (80% neutrophils) on admission, repeat CBC w/ WBC of 24  - likely reactive, however cannot r/o infection entirely (mgmt for sepsis see below)  - trend    ID  #Severe Sepsis  - meets severe sepsis criteria for , RR 38, WBC 20, Lactate 2.6 on admission  - Procalcitonin 0.20  - COVID neg x1; will need repeat COVID swab in AM given travel hx & elevated CRP, D-dimer  - UA negative for UTI; no clear source  - s/p ceftriaxone 1g, azithromycin 500 mg in ED  - started on vanc/zosyn (renally dosed: 2.25g q8h) for empiric coverage  - vanc dosed by trough, next trough due 4/21 4am  - consider d/c abx if patient remains afebrile and no source of infection is found  - f/u blood cultures    Fluids: No IVF currently needed  Electrolytes: Replete w/ caution (ESRD)  Nutrition: NPO except meds  Prophylaxis: Heparin [id number] sc  GI: Protonix 40mg IVP q24hrs  C: FC  Dispo: Admit to MICU/3WO

## 2021-04-20 NOTE — ED ADULT TRIAGE NOTE - CHIEF COMPLAINT QUOTE
Pt presents via EMS for c/o "SOB". Pt has hx of CRF on dialysis M/W/F, not dialyzed since last Monday.  Pt exhibits short/shallow respirations with sub sternal retractions approx 36-38 BPM. Speaking 2-3 word sentences, grunting, diaphoretic. Spo2 via N/c @ 6L/min noted 91%, febrile 99.1. Denies any chest pain. Received NTG x 1 in route for elevated BP.

## 2021-04-20 NOTE — ED PROVIDER NOTE - WR INTERPRETATION 1
CXR - No pneumothorax, + fluid overload/ more on right, vs interstitial infiltrate. post cabg changes. bones normal

## 2021-04-20 NOTE — ED PROVIDER NOTE - OBJECTIVE STATEMENT
esrd on HD mwf, cad s/p cabgx4, stents, htn, here with shortness of breath today. Says he is visiting from [place], unable to get his normal dialysis today. Usually takes amlodipine, metoprolol, prn hydralazine for htn, but didn't take today because he usually holds until after dialysis so his pressure doesn't drop too much. Denies chest pain, fever/chills, cough. Unable to provide further history due to respiratory distress

## 2021-04-20 NOTE — CONSULT NOTE ADULT - ASSESSMENT
51 year old M with PMHx of ICM with unknown baseline ED 2/2 CAD s/p CABG, Moderate AS, HTN, HLD, ESRD on HD who presented to the ER with severe SOB, found to be in Hypoxic respiratory Failure with electrolyte disturbances, s/p Urgent dialysis, now with troponemia and Ischemic EKG changes for which cardiology is being consulted.    51 year old M with PMHx of ICM with unknown baseline EF 2/2 CAD s/p CABG and PCI, Moderate AS, HTN, HLD, ESRD on HD who presented to the ER with severe SOB, found to be in Hypoxic respiratory Failure with electrolyte disturbances, s/p Urgent dialysis, now with troponemia and Ischemic EKG changes for which cardiology is being consulted.       1) NSTEMI in patient with ICM 2/2 CAD s/p CABG and PCI  -Patient presented with acute Hypoxic respiratory Failure in the setting of Missed dialysis session.  -It is unclear if an ischemic event precipitated his pulmonary edema. Pt s/p 4.5 L dialysis with continued pulmonary edema on CXR  -As it is unclear if patient urinates, recommend giving 80 IV lasix x1 to see if UO picks up. Can monitor by bladder scan as patient does not have folley  -EKG with inferolateral ischemic changes with unknown baseline EKG. These are occurring in setting of ongoing troponemia with CK and CKMB elevation  -Recommend NSTEMI management with  mg load and Plavix 300 mg and initiation of Heparin Gtt for 48 hours.  -Would recommend transfer to CCU for management of active ischemia in patient with poor LV function  -Continued management of possible Sepis/ Covid by CCU team  -Recommend obtaining collaterals in regards to patients previous ischemic workup and interventions

## 2021-04-20 NOTE — ED PROVIDER NOTE - MUSCULOSKELETAL, MLM
Spine appears normal, range of motion is not limited, no muscle or joint tenderness. 1+ edema both legs

## 2021-04-20 NOTE — PROGRESS NOTE ADULT - SUBJECTIVE AND OBJECTIVE BOX
IN PROGRESS IN PROGRESS, INCLUDING ASSESSMENT AND PLAN    Overnight Events: Per report,  Patient intubated in ED, admitted to [facility]. Nephro, cardiology consulted for urgent HD & increasing trops/ST depressions/LBBB, respectively. 2g calcium gluconate, 10 IU insulin, 1 amp D50 given for K of 6.7. Bedside echo s/f reduced LVEF (40ish), moderate AS, bicuspid valve. Repeat ABG improved: pH 7.27, pCO2 38, pO2 355; HCO3 decreased from 20 to 17. FiO2 weaned from 100 to 40. HbA1c 7.9%; iron studies s/f iron deficiency; ferritin elevated.    Subjective: Patient seen and examined at bedside. This AM, patient intubated and sedated, unable to participate in ROS.     [OBJECTIVE]:  Vital Signs:  T(F): , Max: 102.4 (21 @ 01:08)  HR:  (83 - 126)  BP:  (82/53 - 190/86)  BP(mean):  (63 - 89)  RR:  (20 - 38)  SpO2:  (91% - 100%)  Wt(kg): --  CVP(cm H2O): --  Mode: AC/ CMV (Assist Control/ Continuous Mandatory Ventilation), RR (machine): 20, RR (patient): 32, TV (machine): 550, FiO2: 100, PEEP: 10, ITime: 1, MAP: 23, PIP: 34     @ 07:  -   @ 07:00  --------------------------------------------------------  IN: 681 mL / OUT: 0 mL / NET: 681 mL     @ 07:01  -   @ 09:00  --------------------------------------------------------  IN: 29 mL / OUT: 0 mL / NET: 29 mL    CAPILLARY BLOOD GLUCOSE    POCT Blood Glucose.: 303 mg/dL (2021 04:51)    Physical Exam:  T(F): 98.2 (21 @ 06:00)  HR: 83 (21 @ 08:00)  BP: 102/57 (21 @ 08:00)  RR: 20 (21 @ 08:00)  SpO2: 98% (21 @ 08:00)  Wt(kg): --    Constitutional: middle-aged male, intubated, sedated  Head: NC/AT  Eyes: PERRL, anicteric sclera  ENT: no nasal discharge; intubated  Neck: supple  Respiratory: crackles B/L; no W/R/R, no retractions  Cardiac: +S1/S2; RRR; no M/R/G; PMI non-displaced  Gastrointestinal: soft, distended; +BSx4  Genitourinary: normal external genitalia; Shen in place  Extremities: no clubbing or cyanosis; 1+ peripheral edema b/l knees; feet b/l cold L>R; s/p well-healed L toe amputation  Musculoskeletal: no joint swelling or erythema  Vascular: 2+ radial, femoral pulses B/L; weak DP/PT pulses  Dermatologic: skin warm, dry and intact; no rashes, wounds; b/l LE hyperpigmented up to mid-tibia; multiple scars from ?surgeries w/ some hypertrophic scar tissue/?keloid noted on chest  Lymphatic: no submandibular or cervical LAD  Neurologic: sedated    Medications:  MEDICATIONS  (STANDING):  chlorhexidine 0.12% Liquid 15 milliLiter(s) Oral Mucosa every 12 hours  chlorhexidine 2% Cloths 1 Application(s) Topical <User Schedule>  dextrose 40% Gel 15 Gram(s) Oral once  dextrose 5%. 1000 milliLiter(s) (50 mL/Hr) IV Continuous <Continuous>  dextrose 5%. 1000 milliLiter(s) (100 mL/Hr) IV Continuous <Continuous>  dextrose 50% Injectable 25 Gram(s) IV Push once  dextrose 50% Injectable 12.5 Gram(s) IV Push once  dextrose 50% Injectable 25 Gram(s) IV Push once  glucagon  Injectable 1 milliGRAM(s) IntraMuscular once  heparin   Injectable 5000 Unit(s) SubCutaneous every 8 hours  insulin regular  human corrective regimen sliding scale   SubCutaneous every 6 hours  pantoprazole  Injectable 40 milliGRAM(s) IV Push every 24 hours  piperacillin/tazobactam IVPB.. 2.25 Gram(s) IV Intermittent every 8 hours  propofol Infusion 5 MICROgram(s)/kG/Min (3.27 mL/Hr) IV Continuous <Continuous>    MEDICATIONS  (PRN):    Allergies:  Allergies    No Known Allergies    Intolerances    Labs:                        10.6   18.48 )-----------( 119      ( 2021 05:33 )             33.7     04-20    132<L>  |  98  |  73<H>  ----------------------------<  299<H>  5.8<H>   |  18<L>  |  8.30<H>    Ca    7.6<L>      2021 05:33  Phos  4.2     04-20  Mg     1.8     04-20    TPro  6.8  /  Alb  3.6  /  TBili  0.3  /  DBili  x   /  AST  21  /  ALT  19  /  AlkPhos  92  04-20    PT/INR - ( 2021 05:33 )   PT: 12.9 sec;   INR: 1.08        PTT - ( 2021 05:33 )  PTT:30.3 sec  Urinalysis Basic - ( 2021 01:45 )    Color: Yellow / Appearance: Clear / S.025 / pH: x  Gluc: x / Ketone: NEGATIVE  / Bili: Negative / Urobili: 0.2 E.U./dL   Blood: x / Protein: 100 mg/dL / Nitrite: NEGATIVE   Leuk Esterase: NEGATIVE / RBC: < 5 /HPF / WBC < 5 /HPF   Sq Epi: x / Non Sq Epi: 0-5 /HPF / Bacteria: Present /HPF    Radiology and other tests:   **Transfer from [facility] MICU to CCU**  IN PROGRESS  Hospital Course:  Mr. Grigsby is a 50 yo M w/ ESRD (HD MWF), CAD (s/p CABGx4, stents), HTN, pacemaker who p/w SOB i/s/o missed dialysis, with numerous electrolyte abnormalities and uptrending cardiac enzymes, initially admitted for urgent HD and management of respiratory failure (presented meeting SIRS criteria, thought to be septic; was tachyneic to 30s even on bipap with increased WOB, s/p intubation  evening. Renal was consulted for urgent dialysis (s/p 4L removal session finishing  AM), and [facility] (initial concern for risk factors given some labs eg lymphopenia and pt's travel hx- came from [place] to visit sister).     Overnight Events: Per report,  Patient intubated in ED, admitted to [facility]WO. Nephro, cardiology consulted for urgent HD & increasing trops/ST depressions/LBBB, respectively. 2g calcium gluconate, 10 IU insulin, 1 amp D50 given for K of 6.7. Bedside echo s/f reduced LVEF (40ish), moderate AS, bicuspid valve. Repeat ABG improved: pH 7.27, pCO2 38, pO2 355; HCO3 decreased from 20 to 17. FiO2 weaned from 100 to 40. HbA1c 7.9%; iron studies s/f iron deficiency; ferritin elevated.    Subjective: Patient seen and examined at bedside. This AM, patient intubated and sedated, unable to participate in ROS.     [OBJECTIVE]:  Vital Signs:  T(F): , Max: 102.4 (- @ 01:08)  HR:  (83 - 126)  BP:  (82/53 - 190/86)  BP(mean):  (63 - 89)  RR:  (20 - 38)  SpO2:  (91% - 100%)  Wt(kg): --  CVP(cm H2O): --  Mode: AC/ CMV (Assist Control/ Continuous Mandatory Ventilation), RR (machine): 20, RR (patient): 32, TV (machine): 550, FiO2: 100, PEEP: 10, ITime: 1, MAP: 23, PIP: 34    - @ 07:01  -   @ 07:00  --------------------------------------------------------  IN: 681 mL / OUT: 0 mL / NET: 681 mL     @ 07:01  -   @ 09:00  --------------------------------------------------------  IN: 29 mL / OUT: 0 mL / NET: 29 mL    CAPILLARY BLOOD GLUCOSE    POCT Blood Glucose.: 303 mg/dL (2021 04:51)    Physical Exam:  T(F): 98.2 (21 @ 06:00)  HR: 83 (21 @ 08:00)  BP: 102/57 (21 @ 08:00)  RR: 20 (21 @ 08:00)  SpO2: 98% (21 @ 08:00)  Wt(kg): --    Constitutional: middle-aged male, intubated, sedated  Head: NC/AT  Eyes: PERRL, anicteric sclera  ENT: no nasal discharge; intubated  Neck: supple  Respiratory: crackles B/L; no W/R/R, no retractions  Cardiac: +S1/S2; RRR; no M/R/G; PMI non-displaced  Gastrointestinal: soft, distended; +BSx4  Genitourinary: normal external genitalia; Shen in place  Extremities: no clubbing or cyanosis; 1+ peripheral edema b/l knees; feet b/l cold L>R; s/p well-healed L toe amputation  Musculoskeletal: no joint swelling or erythema  Vascular: 2+ radial, femoral pulses B/L; weak DP/PT pulses  Dermatologic: skin warm, dry and intact; no rashes, wounds; b/l LE hyperpigmented up to mid-tibia; multiple scars from ?surgeries w/ some hypertrophic scar tissue/?keloid noted on chest  Lymphatic: no submandibular or cervical LAD  Neurologic: sedated    Medications:  MEDICATIONS  (STANDING):  chlorhexidine 0.12% Liquid 15 milliLiter(s) Oral Mucosa every 12 hours  chlorhexidine 2% Cloths 1 Application(s) Topical <User Schedule>  dextrose 40% Gel 15 Gram(s) Oral once  dextrose 5%. 1000 milliLiter(s) (50 mL/Hr) IV Continuous <Continuous>  dextrose 5%. 1000 milliLiter(s) (100 mL/Hr) IV Continuous <Continuous>  dextrose 50% Injectable 25 Gram(s) IV Push once  dextrose 50% Injectable 12.5 Gram(s) IV Push once  dextrose 50% Injectable 25 Gram(s) IV Push once  glucagon  Injectable 1 milliGRAM(s) IntraMuscular once  heparin   Injectable 5000 Unit(s) SubCutaneous every 8 hours  insulin regular  human corrective regimen sliding scale   SubCutaneous every 6 hours  pantoprazole  Injectable 40 milliGRAM(s) IV Push every 24 hours  piperacillin/tazobactam IVPB.. 2.25 Gram(s) IV Intermittent every 8 hours  propofol Infusion 5 MICROgram(s)/kG/Min (3.27 mL/Hr) IV Continuous <Continuous>    MEDICATIONS  (PRN):    Allergies:  Allergies    No Known Allergies    Intolerances    Labs:                        10.6   18.48 )-----------( 119      ( 2021 05:33 )             33.7     04-20    132<L>  |  98  |  73<H>  ----------------------------<  299<H>  5.8<H>   |  18<L>  |  8.30<H>    Ca    7.6<L>      2021 05:33  Phos  4.2     04-20  Mg     1.8     04-20    TPro  6.8  /  Alb  3.6  /  TBili  0.3  /  DBili  x   /  AST  21  /  ALT  19  /  AlkPhos  92  04-20    PT/INR - ( 2021 05:33 )   PT: 12.9 sec;   INR: 1.08        PTT - ( 2021 05:33 )  PTT:30.3 sec  Urinalysis Basic - ( 2021 01:45 )    Color: Yellow / Appearance: Clear / S.025 / pH: x  Gluc: x / Ketone: NEGATIVE  / Bili: Negative / Urobili: 0.2 E.U./dL   Blood: x / Protein: 100 mg/dL / Nitrite: NEGATIVE   Leuk Esterase: NEGATIVE / RBC: < 5 /HPF / WBC < 5 /HPF   Sq Epi: x / Non Sq Epi: 0-5 /HPF / Bacteria: Present /HPF    Radiology and other tests:   **Transfer from [facility] MICU to CCU**  Hospital Course:  Mr. Grigsby is a 52 yo M w/ ESRD (HD MWF), CAD (s/p CABGx4, stents), HTN, defibrillator (unclear year of plct), recent travel from [place] to [facility] to visit sister, p/w SOB i/s/o missed dialysis, with numerous electrolyte abnormalities (s/p hyperkalemic cocktail), initially admitted for urgent HD and management of respiratory failure (presented meeting SIRS criteria thought to be septic and hypertensive urgency s/p initiation of abx and antihypertensives; was tachyneic to 30s even on bipap with increased WOB, s/p intubation  evening with prop for sedation. Renal was consulted for urgent dialysis (s/p 4L removal session finishing  AM), and patient was admitted to [facility] MICU (initial concern for risk factors given some labs eg lymphopenia and pt's travel hx- came from [place] to visit sister), later s/p negative COVID (-) x2. Serial monitoring of CE's and EKG c/f new/worsening ischemic changes, with c/f NSTEMI. Cardiology consulted for assessment, with decisions made to transfer patient to CCU service.     Overnight Events: Per report,  Patient intubated in ED, admitted to [facility]. Nephro, cardiology consulted for urgent HD & increasing trops/ST depressions/LBBB, respectively. 2g calcium gluconate, 10 IU insulin, 1 amp D50 given for K of 6.7. Bedside echo s/f reduced LVEF (40ish), moderate AS, bicuspid valve. Repeat ABG improved: pH 7.27, pCO2 38, pO2 355; HCO3 decreased from 20 to 17. FiO2 weaned from 100 to 40. HbA1c 7.9%; iron studies s/f iron deficiency; ferritin elevated.    Subjective: Patient seen and examined at bedside. This AM, patient intubated and sedated, unable to participate in ROS.     [OBJECTIVE]:  Vital Signs:  T(F): , Max: 102.4 (-21 @ 01:08)  HR:  (83 - 126)  BP:  (82/53 - 190/86)  BP(mean):  (63 - 89)  RR:  (20 - 38)  SpO2:  (91% - 100%)  Wt(kg): --  CVP(cm H2O): --  Mode: AC/ CMV (Assist Control/ Continuous Mandatory Ventilation), RR (machine): 20, RR (patient): 32, TV (machine): 550, FiO2: 100, PEEP: 10, ITime: 1, MAP: 23, PIP: 34     @ 07:01  -   @ 07:00  --------------------------------------------------------  IN: 681 mL / OUT: 0 mL / NET: 681 mL     @ 07:01  -   @ 09:00  --------------------------------------------------------  IN: 29 mL / OUT: 0 mL / NET: 29 mL    CAPILLARY BLOOD GLUCOSE    POCT Blood Glucose.: 303 mg/dL (2021 04:51)    Physical Exam:  T(F): 98.2 (21 @ 06:00)  HR: 83 (21 @ 08:00)  BP: 102/57 (21 @ 08:00)  RR: 20 (21 @ 08:00)  SpO2: 98% (21 @ 08:00)  Wt(kg): --    Constitutional: middle-aged male, intubated, sedated  Head: NC/AT  Eyes: PERRL, anicteric sclera  ENT: no nasal discharge; intubated  Neck: supple  Respiratory: crackles B/L; no W/R/R, no retractions; ETT in place, breathing in sync w/ vent  Cardiac: +S1/S2; RRR; no M/R/G; PMI non-displaced; REJ placed 4/20 AM  Gastrointestinal: soft, distended; +BSx4; NGT placed 4/20 AM  Genitourinary: normal external genitalia; Shen in place  Extremities: no clubbing or cyanosis; 1+ peripheral edema b/l knees; feet b/l cold L>R; s/p well-healed L toe amputation; R arm w/ fistula for dialysis  Musculoskeletal: no joint swelling or erythema  Vascular: 2+ radial, femoral pulses B/L; weak DP/PT pulses  Dermatologic: skin warm, dry and intact; no rashes, wounds; b/l LE hyperpigmented up to mid-tibia; multiple scars from ?surgeries w/ some hypertrophic scar tissue/?keloid noted on chest  Lymphatic: no submandibular or cervical LAD  Neurologic: sedated    Medications:  MEDICATIONS  (STANDING):  chlorhexidine 0.12% Liquid 15 milliLiter(s) Oral Mucosa every 12 hours  chlorhexidine 2% Cloths 1 Application(s) Topical <User Schedule>  dextrose 40% Gel 15 Gram(s) Oral once  dextrose 5%. 1000 milliLiter(s) (50 mL/Hr) IV Continuous <Continuous>  dextrose 5%. 1000 milliLiter(s) (100 mL/Hr) IV Continuous <Continuous>  dextrose 50% Injectable 25 Gram(s) IV Push once  dextrose 50% Injectable 12.5 Gram(s) IV Push once  dextrose 50% Injectable 25 Gram(s) IV Push once  glucagon  Injectable 1 milliGRAM(s) IntraMuscular once  heparin   Injectable 5000 Unit(s) SubCutaneous every 8 hours  insulin regular  human corrective regimen sliding scale   SubCutaneous every 6 hours  pantoprazole  Injectable 40 milliGRAM(s) IV Push every 24 hours  piperacillin/tazobactam IVPB.. 2.25 Gram(s) IV Intermittent every 8 hours  propofol Infusion 5 MICROgram(s)/kG/Min (3.27 mL/Hr) IV Continuous <Continuous>    MEDICATIONS  (PRN):    Allergies:  Allergies    No Known Allergies    Intolerances    Labs:                        10.6   18.48 )-----------( 119      ( 2021 05:33 )             33.7     04-20    132<L>  |  98  |  73<H>  ----------------------------<  299<H>  5.8<H>   |  18<L>  |  8.30<H>    Ca    7.6<L>      2021 05:33  Phos  4.2     04-20  Mg     1.8     04-20    TPro  6.8  /  Alb  3.6  /  TBili  0.3  /  DBili  x   /  AST  21  /  ALT  19  /  AlkPhos  92  04-20    PT/INR - ( 2021 05:33 )   PT: 12.9 sec;   INR: 1.08        PTT - ( 2021 05:33 )  PTT:30.3 sec  Urinalysis Basic - ( 2021 01:45 )    Color: Yellow / Appearance: Clear / S.025 / pH: x  Gluc: x / Ketone: NEGATIVE  / Bili: Negative / Urobili: 0.2 E.U./dL   Blood: x / Protein: 100 mg/dL / Nitrite: NEGATIVE   Leuk Esterase: NEGATIVE / RBC: < 5 /HPF / WBC < 5 /HPF   Sq Epi: x / Non Sq Epi: 0-5 /HPF / Bacteria: Present /HPF    Radiology and other tests:

## 2021-04-20 NOTE — CONSULT NOTE ADULT - SUBJECTIVE AND OBJECTIVE BOX
Patient is a 51y old  Male who presents with a chief complaint of SOB (2021 03:45)      HPI:    51 year old M with history of ESRD ( HD mwf), cad s/p cabgx4 (unknown date), stents (unknown dates), pacemaker, htn, presents today with severe shortness of breath after missing dialysis. Patient admitted for hypoxic and hypercarbic, acute respiratory failure 2/2 to pulmonary edema pending urgent dialysis, HTN emergency. History obtained from ED provider note, as patient was too tachypneic by time of interview to provide history. In brief, the patient has a history of ischemic heart disease and ESRD. He had been traveling in [place] and last had dialysis on . He was scheduled to have dialysis on  but missed it. He then presented to the ED with severe SOB. He also states that he takes amlodipine, metoprolol, prn hydralazine for htn, but didn't take any today. Unable to complete ROS.     ED Course:  Vitals: /86, , RR 38, O2 91% on 6L NC, T 99.1F  Labs s/f WBC 20 (80% neutrophils), Lactate 2.6, CO2 19, AG 18, BUN/Cr 68/7.7, Ca 8.1, Phos 5.2; ABG pH 7.21  CXR s/f pleural effusion R>L  Medications: tylenol 1g IV, azithromycin 500mg, ceftriaxone 1g, lasix 80mg IVP, hydralazine 10mg IVP, labetalol 20mg IVP, nitroglycerin 0.4mg sublingual x2; started on propofol gtt s/p intubation  Consults: Nephrology, ICU consult (2021 03:03)      Allergies    No Known Allergies    Intolerances      Home Medications:      SOCIAL HX:     Smoking          ETOH/Illicit drugs          Occupation    PAST MEDICAL & SURGICAL HISTORY:  ESRD (end stage renal disease) on dialysis    HTN (hypertension)    CAD (coronary artery disease)    S/P CABG x 4        FAMILY HISTORY:  :    No known cardiovascular or pulmonary family history     ROS:  See HPI     PHYSICAL EXAM    ICU Vital Signs Last 24 Hrs  T(C): 36.9 (2021 04:00), Max: 39.1 (2021 01:08)  T(F): 98.5 (2021 04:00), Max: 102.4 (2021 01:08)  HR: 93 (2021 04:55) (88 - 126)  BP: 121/58 (2021 04:55) (82/53 - 190/86)  BP(mean): 83 (2021 04:55) (63 - 83)  ABP: --  ABP(mean): --  RR: 24 (2021 04:55) (20 - 38)  SpO2: 100% (2021 04:55) (91% - 100%)      General: acutely tachypneic and now intubated   HEENT: NC/AT; anicteric sclera; MMM  Neck: supple  Cardiovascular: +S1/S2; regular rhythm; tachycardic   Respiratory: diffuse crackles bilaterally; using accessory muscles before intubation while on Bipap; in distress; negative stridor   Gastrointestinal: soft, NT/ND; +BSx4  Extremities: lower extremities cool to touch; pulses present with doppler; fistula present in right upper extremity with positive thrill and bruit  Vascular: pulses able to be heard with doppler   Neurological: sedated     LABS:                          12.1   24.37 )-----------( 164      ( 2021 03:05 )             38.6                                               04-20    132<L>  |  97  |  69<H>  ----------------------------<  293<H>  6.7<HH>   |  19<L>  |  8.01<H>    Ca    7.6<L>      2021 03:05  Phos  5.6     04-20  Mg     1.8     04-20    TPro  7.9  /  Alb  4.2  /  TBili  0.3  /  DBili  x   /  AST  20  /  ALT  23  /  AlkPhos  108  04-20      PT/INR - ( 2021 03:05 )   PT: 12.6 sec;   INR: 1.05          PTT - ( 2021 03:05 )  PTT:27.9 sec                                       Urinalysis Basic - ( 2021 01:45 )    Color: Yellow / Appearance: Clear / S.025 / pH: x  Gluc: x / Ketone: NEGATIVE  / Bili: Negative / Urobili: 0.2 E.U./dL   Blood: x / Protein: 100 mg/dL / Nitrite: NEGATIVE   Leuk Esterase: NEGATIVE / RBC: < 5 /HPF / WBC < 5 /HPF   Sq Epi: x / Non Sq Epi: 0-5 /HPF / Bacteria: Present /HPF        CARDIAC MARKERS ( 2021 03:05 )  x     / 0.14 ng/mL / 449 U/L / x     / 8.3 ng/mL  CARDIAC MARKERS ( 2021 00:48 )  x     / 0.11 ng/mL / x     / x     / x                                                LIVER FUNCTIONS - ( 2021 03:05 )  Alb: 4.2 g/dL / Pro: 7.9 g/dL / ALK PHOS: 108 U/L / ALT: 23 U/L / AST: 20 U/L / GGT: x                                                                                                   Mode: AC/ CMV (Assist Control/ Continuous Mandatory Ventilation)  RR (machine): 20  TV (machine): 550  FiO2: 100  PEEP: 10  ITime: 1  MAP: 23  PIP: 34                                      ABG - ( 2021 04:12 )  pH, Arterial: 7.27  pH, Blood: x     /  pCO2: 38    /  pO2: 355   / HCO3: 17    / Base Excess: -9.1  /  SaO2: 99                  CXR: diffuse bilateral infiltrates     ECHO: Pending    MEDICATIONS  (STANDING):  dextrose 40% Gel 15 Gram(s) Oral once  dextrose 5%. 1000 milliLiter(s) (50 mL/Hr) IV Continuous <Continuous>  dextrose 5%. 1000 milliLiter(s) (100 mL/Hr) IV Continuous <Continuous>  dextrose 50% Injectable 25 Gram(s) IV Push once  dextrose 50% Injectable 12.5 Gram(s) IV Push once  dextrose 50% Injectable 25 Gram(s) IV Push once  glucagon  Injectable 1 milliGRAM(s) IntraMuscular once  heparin   Injectable 5000 Unit(s) SubCutaneous every 8 hours  insulin lispro (ADMELOG) corrective regimen sliding scale   SubCutaneous every 6 hours  pantoprazole  Injectable 40 milliGRAM(s) IV Push every 24 hours  piperacillin/tazobactam IVPB.. 2.25 Gram(s) IV Intermittent every 8 hours  propofol Infusion 5 MICROgram(s)/kG/Min (3.27 mL/Hr) IV Continuous <Continuous>  vancomycin  IVPB 1500 milliGRAM(s) IV Intermittent once    MEDICATIONS  (PRN):

## 2021-04-20 NOTE — PROCEDURE NOTE - NSSITEPREP_SKIN_A_CORE
alcohol/chlorhexidine
chlorhexidine/Adherence to aseptic technique: hand hygiene prior to donning barriers (gown, gloves), don cap and mask, sterile drape over patient

## 2021-04-20 NOTE — ED PROVIDER NOTE - RATE
Post Operative Instructions for a Laparoscopic Hernia Repair    Activity: Patient to limit lifting to 20 lbs for the first month and then advance slowly as tolerated. Please use your pain as a guide to advance activity.     Diet: As tolerated. No greasy spicy food today   Push fluids    Pain:  A script for pain medication has been sent to your pharmacy. Please only use it if needed. We recommend you use ibuprofen and tylenol for pain control first. You may take 600 mg of ibuprofen every 6 hours for pain. Not to exceed 2400 mg of ibuprofen in a 24 hour period. Please may sure you are taking it with food. Additionally, you may use tylenol. Do not exceed 4000 mg of tylenol (acetaminophen) in a 24 hour period. Please note that you pain medication does contain 325 mg of tylenol with each tablet.     Constipation: If you struggle with constipation or have not had a bowel movement after two days, we recommend miralax over the counter.     Incision: Ok to shower 24 hours after surgery. Please do not soak incision site for one week.  May use ice as needed.     If an  undergarmet was provided to you, you should wear this as needed for comfort or when active.     Follow up with our office in ten days or sooner if you are having problems.     If you have any questions please feel free to call at [phone].      If you have any concerns following your Procedure/Surgery and cannot reach your physician's office, please contact [facility] at [phone] for assistance.              Care After Anesthesia or Sedation    After discharge  • Due to the medicine given, someone must drive you home. It is strongly recommended to have someone stay with you at home the day of discharge and the night after surgery.  • If you have infants or small children at home, please have someone help you for at least 24 hours after your surgery.  • Do not drive for at least 24 hours after surgery (or as told by your doctor).  •  Rest for the remainder of the day. Go up and down stairs slowly.  • Do not smoke after surgery. Smoking can delay healing.  • Do not operate heavy or potential harmful equipment.  • Do not make legally binding decisions.  • Do not drink alcohol for 24 hours.    Diet   • Drink plenty of fluids (6 to 8 glasses of water a day).  • Resume your regular diet as able.  • Avoid greasy or spicy foods for 24 hours.  • Start eating a bland diet (toast, gelatin, 7-up, hot cereal, crackers, sherbet, broth soup).      Nausea   • Nausea may be expected for the first 24 to 48 hours.  • Drink small amounts of fluids such as water or sports drink  • Try sucking on hard candy      Urination  • The effects of anesthetics may cause some people to have trouble passing urine the day of surgery. Drink a lot of fluids to help prevent this.  • Try to urinate within 8 hours of surgery.  • If you are unable to pass urine and feel like you need to, call your doctor or the hospital.    Pain control  • Some incisions are injected with a long acting local anesthetic; it will wear off in 4 to 6 hours. You can expect to have some pain at this time.  • Treat your pain with the prescribed pain medicine before it wears off. Do not wait until your pain becomes severe.  • Ask your nurse when you had your last pain medicine, so you know when you can take another one after you get home.    Call your doctor if you have:  • Nausea and vomiting that does not stop  • Fever over 101° F  • Pain not relieved by pain medication  • If you are unable to pass your urine or you have not passed urine in the last 8 hours.  • Unusual changes in behavior  • Dizziness  • Hives  If you are not able to reach your doctor, you may call the emergency department.    Wound Healing: What You Should Know    Do I have an infection?  Your body may show signs your wound is infected. If you see one or more of these signs, please call your health care provider:  • Redness and swelling -  Increased redness and swelling around the wound (some redness is expected in the first 48 hours).  • Warmth - The area around the wound is warmer than the surrounding skin.  • Fever - Your temperature is above 101º F or stays above 100º F.  • Odor - A new or stronger, sweet or foul smell coming from the wound.  • Swelling - Swelling spreading to other areas.  • Drainage - Large amounts of drainage that involves blood, clear fluid or pus from the wound. Or, the drainage amount increases or changes color. (It is normal to have a small amount of drainage.)  • Pain - Increase in your pain or change in the location of the pain.  • Separation - Any place where the incision is  or opening.    How can I help prevent the spread of infection when I take care of my wound?  1. Use good handwashing techniques before and after contact with your wound. Here are the steps to follow:  Handwashing with soap and water:  • Wet hands with warm water and apply soap.  • Rub well over all surfaces for at least 15 seconds.  • Rinse well under water.  • Dry hands with clean towels.  • Turn off faucet with clean towels to prevent reinfecting hands.  Handwashing with a waterless alcohol-based product or gel:  • Apply approximately a nickel sized amount of product to palm of hand.  • Rub hands together, covering all surfaces including nails, until product evaporates, about 10 to 15 seconds.  2. Use clean latex or vinyl gloves if cleaning or touching wound surface.  3. Keep nails short and remove nail polish. Long nails or polish can harbor bacteria.  4. Keep jewelry clean or remove during wound care.  5. Use hypoallergenic lotions with anti-bacterial agents on skin surrounding wound to maintain moisture and prevent irritation.    What else can I do to help my wound heal quickly and prevent other wounds?  • Stay active - Activity and exercise promote good circulation, which leads to wound healing.  • Avoid smoking - Smoking narrows  130 your blood vessels, which decreases both the blood supply to your wound and the amount of oxygen in your blood. This will decrease your ability to heal and increase your chance of infection.  • Avoid alcohol - Alcohol decreases the ability of your body to fight infection.  • Avoid sitting with legs or ankles crossed - This can compress the blood vessels in your legs and decrease the blood supply to your wound.  • Eat a balanced diet - If you are unable to eat a balanced diet or required foods, you may benefit from a vitamin or mineral supplement.    Pain Management  Special Note: Be sure to follow any specific post-op instructions from your surgeon or nurse.   Once you’re home, you may have some pain, since even minor surgery causes swelling and breakdown of tissue. When it comes to effective pain management, the tips you learned in the hospital also work at home. To get the best pain relief possible, remember these points:    Use your medication only as directed  · If your pain is not relieved or if it gets worse, call your doctor.  · If pain lessens, try taking your medication less often.  Remember that medications need time to work  · Most pain relievers taken by mouth need at least 20-30 minutes to take effect.  · Take pain medication at regular times as directed. Don’t wait until the pain gets bad to take it.  Time your medication  · Try to time your medication so that you take it before beginning an activity, such as dressing or sitting at the table for dinner.  · Taking your medication at night may help you get a good night’s rest.  Eat lots of fruit and vegetables  · Constipation is a common side effect with some pain medications. Eating fruit and vegetables can help.  · Drink lots of fluids.  Avoid drinking alcohol while taking pain medication  · Mixing alcohol and pain medication can cause dizziness and slow your respiratory system. It can even be fatal.  · Avoid driving or operating machinery while taking  pain medication.  In addition, other ways to decrease pain  •    Relaxing techniques-slow, deep breathing, imagery, watching TV, listening to music   •    Heat or cold   •    Massage   •    Rest and changing your position in bed         Cough and deep breath hourly while a wake, or use the incentive spirometer  Rotate you ankles and bend your knees frequently.  Take short frequent walks

## 2021-04-20 NOTE — PROCEDURE NOTE - NSINDICATIONS_GEN_A_CORE
arterial puncture to obtain ABG's/critical patient/monitoring purposes arterial puncture to obtain ABG's/blood sampling/cannulation purposes/critical patient/monitoring purposes

## 2021-04-20 NOTE — ED ADULT NURSE NOTE - OBJECTIVE STATEMENT
pt BIBA by EMS for c/o "SOB". Pt has hx of CRF on dialysis M/W/F, not dialyzed since last Monday.  Pt exhibits short/shallow respirations with sub sternal retractions approx 36-38/min, Speaking 2-3 word sentences, grunting, diaphoretic. Spo2 via N/c @ 6L/min noted 91% Denies any chest pain. Received NTG x 1 in route for elevated BP. R.AVF

## 2021-04-20 NOTE — ED PROVIDER NOTE - PMH
CAD (coronary artery disease)    ESRD (end stage renal disease) on dialysis    HTN (hypertension)

## 2021-04-20 NOTE — CHART NOTE - NSCHARTNOTEFT_GEN_A_CORE
Spoke to sister, Urszula, to inform her that brother, Mr. Alex Grigsby is admitted into the ICU service. He is negative COVID x2.   Sister states he flew from [place] to [facility] to visit her on Saturday 4/17. He has a history of DM2, peripheral artery disease, systolic heart failure s/p defibrillator (unknown year of placement), ESRD (MWF), CAD s/p CABG with 4 stents.   She does not know what medications he takes. Most of his doctors are in [place].   States he is not  and is not of kin. She will obtain more collateral in regards to his medical history from [place] to help medical staff. Spoke to sister, Urszula, to inform her that brother, Mr. Alex Grigsby is admitted into the ICU service. He is negative COVID x2.   Sister states he flew from [place] to [facility] to visit her on Saturday 4/17.  Last HD was on Friday 4/16. He has a history of DM2, peripheral artery disease, systolic heart failure s/p defibrillator (unknown year of placement), ESRD (MWF), CAD s/p CABG with 4 stents.   She does not know what medications he takes. Most of his doctors are in [place].   States he is not  and is not of kin. She will obtain more collateral in regards to his medical history from [place] to help medical staff. Spoke to sister, Urszula, to inform her that brother, Mr. Alex Grigsby is admitted into the ICU service. He is negative COVID x2.   Sister states he flew from [place] to [facility] to visit her on Saturday 4/17.  Last HD was on Friday 4/16. Missed a session of dialysis on Monday 4/19 because he was visiting in [facility].   He has a history of DM2, peripheral artery disease, systolic heart failure s/p defibrillator (unknown year of placement), ESRD (MWF), CAD s/p CABG with 4 stents.   She does not know what medications he takes. Most of his doctors are in [place].   States he is not  and is not of kin. She will obtain more collateral in regards to his medical history from [place] to help medical staff.

## 2021-04-20 NOTE — H&P ADULT - NSICDXPASTMEDICALHX_GEN_ALL_CORE_FT
PAST MEDICAL HISTORY:  CAD (coronary artery disease)     ESRD (end stage renal disease) on dialysis     HTN (hypertension)

## 2021-04-20 NOTE — H&P ADULT - NSHPLABSRESULTS_GEN_ALL_CORE
.  LABS:                         12.5   . )-----------( 181      ( 2021 00:48 )             40.5     04-    136  |  99  |  68<H>  ----------------------------<  233<H>  5.1   |  19<L>  |  7.70<H>    Ca    8.1<L>      2021 00:48  Phos  5.2     -  Mg     1.9         TPro  8.3  /  Alb  4.6  /  TBili  0.4  /  DBili  x   /  AST  21  /  ALT  24  /  AlkPhos  118  -20    PT/INR - ( 2021 00:48 )   PT: 12.5 sec;   INR: 1.04          PTT - ( 2021 00:48 )  PTT:27.9 sec  Urinalysis Basic - ( 2021 01:45 )    Color: Yellow / Appearance: Clear / S.025 / pH: x  Gluc: x / Ketone: NEGATIVE  / Bili: Negative / Urobili: 0.2 E.U./dL   Blood: x / Protein: 100 mg/dL / Nitrite: NEGATIVE   Leuk Esterase: NEGATIVE / RBC: < 5 /HPF / WBC < 5 /HPF   Sq Epi: x / Non Sq Epi: 0-5 /HPF / Bacteria: Present /HPF      CARDIAC MARKERS ( 2021 00:48 )  x     / 0.11 ng/mL / x     / x     / x            Lactate, Blood: 2.6 mmol/L ( @ 01:24)      RADIOLOGY, EKG & ADDITIONAL TESTS: Reviewed.

## 2021-04-20 NOTE — PROCEDURE NOTE - NSINFORMCONSENT_GEN_A_CORE
patient's sister/Benefits, risks, and possible complications of procedure explained to patient/caregiver who verbalized understanding and gave verbal consent.
This was an emergent procedure.

## 2021-04-20 NOTE — CONSULT NOTE ADULT - SUBJECTIVE AND OBJECTIVE BOX
Patient is a 51 year old M with PMHx of ICM with unknown baseline ED 2/2 CAD s/p CABG, Moderate AS, HTN, HLD, ESRD on HD who presented to the ER with severe SOB, found to be in Hypoxic respiratory Failure with electrolyte disturbances, s/p Urgent dialysis, now with troponemia and Ischemic EKG changes for which cardiology is being consulted.       PAST MEDICAL & SURGICAL HISTORY:  ESRD (end stage renal disease) on dialysis  HTN (hypertension)  CAD (coronary artery disease)  S/P CABG x 4    Home Medications:  Metoprolol  Norvasc  Hydralazine      MEDICATIONS  (STANDING):  chlorhexidine 0.12% Liquid 15 milliLiter(s) Oral Mucosa every 12 hours  chlorhexidine 2% Cloths 1 Application(s) Topical <User Schedule>  dextrose 40% Gel 15 Gram(s) Oral once  dextrose 5%. 1000 milliLiter(s) (50 mL/Hr) IV Continuous <Continuous>  dextrose 5%. 1000 milliLiter(s) (100 mL/Hr) IV Continuous <Continuous>  dextrose 50% Injectable 25 Gram(s) IV Push once  dextrose 50% Injectable 12.5 Gram(s) IV Push once  dextrose 50% Injectable 25 Gram(s) IV Push once  glucagon  Injectable 1 milliGRAM(s) IntraMuscular once  heparin   Injectable 5000 Unit(s) SubCutaneous every 8 hours  insulin regular  human corrective regimen sliding scale   SubCutaneous every 6 hours  norepinephrine Infusion 0.05 MICROgram(s)/kG/Min (10.2 mL/Hr) IV Continuous <Continuous>  pantoprazole  Injectable 40 milliGRAM(s) IV Push every 24 hours  piperacillin/tazobactam IVPB.. 2.25 Gram(s) IV Intermittent every 8 hours  propofol Infusion 5 MICROgram(s)/kG/Min (3.27 mL/Hr) IV Continuous <Continuous>    MEDICATIONS  (PRN):    .  VITAL SIGNS:  T(C): 36.3 (21 @ 10:02), Max: 39.1 (21 @ 01:08)  T(F): 97.4 (21 @ 10:02), Max: 102.4 (21 @ 01:08)  HR: 80 (21 @ 10:00) (80 - 126)  BP: 107/60 (21 @ 10:00) (78/51 - 190/86)  BP(mean): 77 (21 @ 10:00) (58 - 89)  RR: 20 (21 @ 10:00) (20 - 38)  SpO2: 99% (21 @ 10:00) (91% - 100%)  Wt(kg): --        PHYSICAL EXAM: INCOMPLETE     Constitutional: WDWN resting comfortably in bed; NAD  Head: NC/AT  Eyes: PERRL, EOMI, anicteric sclera  ENT: no nasal discharge; uvula midline, no oropharyngeal erythema or exudates; MMM  Neck: supple; no JVD or thyromegaly  Respiratory: CTA B/L; no W/R/R, no retractions  Cardiac: +S1/S2; RRR; no M/R/G; PMI non-displaced  Gastrointestinal: soft, NT/ND; no rebound or guarding; +BSx4  Genitourinary: normal external genitalia  Back: spine midline, no bony tenderness or step-offs; no CVAT B/L  Extremities: WWP, no clubbing or cyanosis; no peripheral edema  Musculoskeletal: NROM x4; no joint swelling, tenderness or erythema  Vascular: 2+ radial, femoral, DP/PT pulses B/L  Dermatologic: skin warm, dry and intact; no rashes, wounds, or scars  Lymphatic: no submandibular or cervical LAD  Neurologic: AAOx3; CNII-XII grossly intact; no focal deficits  Psychiatric: affect and characteristics of appearance, verbalizations, behaviors are appropriate      .  LABS:                         10.6   18.48 )-----------( 119      ( 2021 05:33 )             33.7     04-20    132<L>  |  98  |  73<H>  ----------------------------<  299<H>  5.8<H>   |  18<L>  |  8.30<H>    Ca    7.6<L>      2021 05:33  Phos  2.9     04-20  Mg     1.9     04-20    TPro  6.8  /  Alb  3.6  /  TBili  0.3  /  DBili  x   /  AST  21  /  ALT  19  /  AlkPhos  92  04-20    PT/INR - ( 2021 05:33 )   PT: 12.9 sec;   INR: 1.08          PTT - ( 2021 05:33 )  PTT:30.3 sec  Urinalysis Basic - ( 2021 01:45 )    Color: Yellow / Appearance: Clear / S.025 / pH: x  Gluc: x / Ketone: NEGATIVE  / Bili: Negative / Urobili: 0.2 E.U./dL   Blood: x / Protein: 100 mg/dL / Nitrite: NEGATIVE   Leuk Esterase: NEGATIVE / RBC: < 5 /HPF / WBC < 5 /HPF   Sq Epi: x / Non Sq Epi: 0-5 /HPF / Bacteria: Present /HPF      CARDIAC MARKERS ( 2021 05:33 )  x     / 0.31 ng/mL / 433 U/L / x     / 11.1 ng/mL  CARDIAC MARKERS ( 2021 03:05 )  x     / 0.14 ng/mL / 449 U/L / x     / 8.3 ng/mL  CARDIAC MARKERS ( 2021 00:48 )  x     / 0.11 ng/mL / x     / x     / x          Serum Pro-Brain Natriuretic Peptide: 52417 pg/mL ( @ 03:05)    Lactate, Blood: 1.7 mmol/L ( @ 03:05)  Lactate, Blood: 2.6 mmol/L ( @ 01:24)      RADIOLOGY, EKG & ADDITIONAL TESTS: Reviewed.       < from: TTE Echo Limited or F/U (21 @ 04:38) >  CONCLUSIONS:     1. Limited study obtained for evaluation of left ventricular function performed by cardiology fellow on call.   2. The aortic leaflets are thickened and calcified with reduced systolic leaflet excursion. Aortic stenosis present. Accurate assessment of aortic stenosis severity cannot be made due to suboptimal doppler interrogation.   3. The right ventricle is not well visualized.   4. Poor endocardial delineation precludes accurate assessment of regional wall motion and LV systolic function. Left ventricular systolic function is severely reduced with a calculated ejection fraction of 25-30% with probably global hypokinesis in limited views.   5. No pericardial effusion.   6. Right pleural effusion.   7. No prior echo is available for comparison.    < end of copied text >     Patient is a 51 year old M with PMHx of ICM with unknown baseline ED 2/2 CAD s/p CABG, Moderate AS, HTN, HLD, ESRD on HD who presented to the ER with severe SOB, found to be in Hypoxic respiratory Failure with electrolyte disturbances, s/p Urgent dialysis, now with troponemia and Ischemic EKG changes for which cardiology is being consulted.     Patient with Inferolateral ischemia in trended EKG troughout course of Hospital stay. Cardiac enzymes have continued to rise.      PAST MEDICAL & SURGICAL HISTORY:  ESRD (end stage renal disease) on dialysis  HTN (hypertension)  CAD (coronary artery disease)  S/P CABG x 4    Home Medications:  Metoprolol  Norvasc  Hydralazine      MEDICATIONS  (STANDING):  chlorhexidine 0.12% Liquid 15 milliLiter(s) Oral Mucosa every 12 hours  chlorhexidine 2% Cloths 1 Application(s) Topical <User Schedule>  dextrose 40% Gel 15 Gram(s) Oral once  dextrose 5%. 1000 milliLiter(s) (50 mL/Hr) IV Continuous <Continuous>  dextrose 5%. 1000 milliLiter(s) (100 mL/Hr) IV Continuous <Continuous>  dextrose 50% Injectable 25 Gram(s) IV Push once  dextrose 50% Injectable 12.5 Gram(s) IV Push once  dextrose 50% Injectable 25 Gram(s) IV Push once  glucagon  Injectable 1 milliGRAM(s) IntraMuscular once  heparin   Injectable 5000 Unit(s) SubCutaneous every 8 hours  insulin regular  human corrective regimen sliding scale   SubCutaneous every 6 hours  norepinephrine Infusion 0.05 MICROgram(s)/kG/Min (10.2 mL/Hr) IV Continuous <Continuous>  pantoprazole  Injectable 40 milliGRAM(s) IV Push every 24 hours  piperacillin/tazobactam IVPB.. 2.25 Gram(s) IV Intermittent every 8 hours  propofol Infusion 5 MICROgram(s)/kG/Min (3.27 mL/Hr) IV Continuous <Continuous>    MEDICATIONS  (PRN):    .  VITAL SIGNS:  T(C): 36.3 (21 @ 10:02), Max: 39.1 (21 @ 01:08)  T(F): 97.4 (21 @ 10:02), Max: 102.4 (21 @ 01:08)  HR: 80 (21 @ 10:00) (80 - 126)  BP: 107/60 (21 @ 10:00) (78/51 - 190/86)  BP(mean): 77 (21 @ 10:00) (58 - 89)  RR: 20 (21 @ 10:00) (20 - 38)  SpO2: 99% (21 @ 10:00) (91% - 100%)  Wt(kg): --        PHYSICAL EXAM:    Constitutional: Intubated and sedated in NAD  Head: NC/AT  Neck: supple; no JVD   Respiratory: CTA in the anterior fields. DEC BS at the bases  Cardiac: +S1/S2; RRR; no M/R/G;  Gastrointestinal: soft, NT/Distended; no rebound or guarding; +BS  Extremities: WWP, no clubbing or cyanosis; no peripheral edema  Vascular: 2+ radial,  DP/PT pulses B/L  Neurologic: AAOx3; CNII-XII grossly intact; no focal deficits        .  LABS:                         10.6   18.48 )-----------( 119      ( 2021 05:33 )             33.7     04-20    132<L>  |  98  |  73<H>  ----------------------------<  299<H>  5.8<H>   |  18<L>  |  8.30<H>    Ca    7.6<L>      2021 05:33  Phos  2.9     04-20  Mg     1.9     04-20    TPro  6.8  /  Alb  3.6  /  TBili  0.3  /  DBili  x   /  AST  21  /  ALT  19  /  AlkPhos  92  04-20    PT/INR - ( 2021 05:33 )   PT: 12.9 sec;   INR: 1.08          PTT - ( 2021 05:33 )  PTT:30.3 sec  Urinalysis Basic - ( 2021 01:45 )    Color: Yellow / Appearance: Clear / S.025 / pH: x  Gluc: x / Ketone: NEGATIVE  / Bili: Negative / Urobili: 0.2 E.U./dL   Blood: x / Protein: 100 mg/dL / Nitrite: NEGATIVE   Leuk Esterase: NEGATIVE / RBC: < 5 /HPF / WBC < 5 /HPF   Sq Epi: x / Non Sq Epi: 0-5 /HPF / Bacteria: Present /HPF      CARDIAC MARKERS ( 2021 05:33 )  x     / 0.31 ng/mL / 433 U/L / x     / 11.1 ng/mL  CARDIAC MARKERS ( 2021 03:05 )  x     / 0.14 ng/mL / 449 U/L / x     / 8.3 ng/mL  CARDIAC MARKERS ( 2021 00:48 )  x     / 0.11 ng/mL / x     / x     / x          Serum Pro-Brain Natriuretic Peptide: 80297 pg/mL ( @ 03:05)    Lactate, Blood: 1.7 mmol/L ( @ 03:05)  Lactate, Blood: 2.6 mmol/L ( @ 01:24)      RADIOLOGY, EKG & ADDITIONAL TESTS: Reviewed.       < from: TTE Echo Limited or F/U (21 @ 04:38) >  CONCLUSIONS:     1. Limited study obtained for evaluation of left ventricular function performed by cardiology fellow on call.   2. The aortic leaflets are thickened and calcified with reduced systolic leaflet excursion. Aortic stenosis present. Accurate assessment of aortic stenosis severity cannot be made due to suboptimal doppler interrogation.   3. The right ventricle is not well visualized.   4. Poor endocardial delineation precludes accurate assessment of regional wall motion and LV systolic function. Left ventricular systolic function is severely reduced with a calculated ejection fraction of 25-30% with probably global hypokinesis in limited views.   5. No pericardial effusion.   6. Right pleural effusion.   7. No prior echo is available for comparison.    < end of copied text >

## 2021-04-20 NOTE — PROGRESS NOTE ADULT - ASSESSMENT
Mr. Grigsby is a 50 yo M w/ ESRD (HD MWF), CAD (s/p CABGx4, stents), HTN, pacemaker who p/w SOB i/s/o missed dialysis, admitted for urgent HD and management of respiratory failure s/p intubation.    NEURO  #Sedation  - propofol gtt   - trend TGs, AM     PULM  #Acute hypoxic and hypercapnic respiratory failure  - Pt p/w dyspnea, escalated from NC -> BiPAP (EPap/IPap 25/10, fio2 70, RR 15 -> ABG: pH 7.21, pCO2 51) -> Intubation (vent settings: FiO2 100%//RR 20/PEEP 10)  - likely 2/2 fluid overload i/s/o missed HD 4/19; CXR s/f pleural effusions  ·	- planned for emergent HD o/n- repeat CXR 4/20 after dialysis finishes in AM    CARDIOVASCULAR  #CAD  - s/p CABG, stents  - try to obtain collateral in AM    #HTN  - home meds amlodipine, metoprolol, prn hydralazine after HD per ED note  - s/p hydralazine 10mg IVP, labetalol 20mg IVP, nitroglycerin 0.4mg sublingual x2 in ED 2/2 hypertensive urgency on admission  - currently holding home meds i/s/o sedation; restart when indicated    #Troponemia  - Trops 0.11 on admission, repeat 0.14, CKMP 8.3,   - EKG w/ LBBB, ST depressions in I, II, III, aVF, V5, V6  - likely demand ischemia 2/2 HTN, ESRD  - cardiology consulted, recs appreciated  - trend trops    #R/o Heart failure  - bedside echo s/f reduced LVEF (approximately 40%), moderate AS w/ bicuspid valve  - f/u official echo  - f/u HbA1c, TSH, lipid profile  - consider pacemaker interrogation    RENAL  #ESRD  - HD M/W/F, missed Monday 4/19 session p/w SOB likely 2/2 volume overload -> s/p Lasix 80mg IVP in ED  - BUN/Cr 68/7.7 on admission   - renal consulted; recs appreciated  - plan for emergent HD o/n    #Volume status  - CXR w/ pleural effusion i/s/o of missed HD, fluid overload  - strict I&Os  - get repeat CXR post HD    #Mixed Metabolic & Respiratory Acidosis   - pH 7.21, pCO2 51, HCO3 20, Lactate 2.6  - plan for emergent HD, patient intubated  - initially w/ elevated anion gap, repeat labs: resolved; lactate wnl.  - continue to monitor, f/u repeat ABG    #Electrolyte abnormalities  - Ca 8.1 on admission; ionized Ca 1.03 (L)  - likely i/s/o ESRD, hyperphosphatemia (5.2)  - K 6.7 on repeat BMP: 2g calcium gluconate, 10 IU insulin, 1 amp D50 given  - plan for emergent HD o/n  - continue to monitor, f/u labs post HD    ENDOCRINE  #Glucose control  - currently on low dose sliding scale q6h  - f/u HbA1c  - monitor FSG  - obtain collateral for ?hx of DM    HEME  #Anemia  - Hb 12.5 on admission, MCV 88.8  - no signs of active bleeding  - most likely 2/2 ESRD  - f/u iron studies, B12, folate    #Leukocytosis  - WBC 20 (80% neutrophils) on admission, repeat CBC w/ WBC of 24  - likely reactive, however cannot r/o infection entirely (mgmt for sepsis see below)  - trend    ID  #Severe Sepsis  - meets severe sepsis criteria for , RR 38, WBC 20, Lactate 2.6 on admission  - Procalcitonin 0.20  - COVID neg x1; will need repeat COVID swab in AM given travel hx & elevated CRP, D-dimer  - UA negative for UTI; no clear source  - s/p ceftriaxone 1g, azithromycin 500 mg in ED  - started on vanc/zosyn (renally dosed: 2.25g q8h) for empiric coverage  - vanc dosed by trough, next trough due 4/21 4am  - consider d/c abx if patient remains afebrile and no source of infection is found  - f/u blood cultures    Fluids: No IVF currently needed  Electrolytes: Replete w/ caution (ESRD)  Nutrition: NPO except meds  Prophylaxis: Heparin [id number] sc  GI: Protonix 40mg IVP q24hrs  C: FC  Dispo: Admit to MICU/3WO   Mr. Grigsby is a 52 yo M w/ ESRD (HD MWF), CAD (s/p CABGx4, stents), HTN, pacemaker who p/w SOB i/s/o missed dialysis, admitted for urgent HD and management of respiratory failure s/p intubation.    NEURO  #Sedation  - propofol gtt   - trend TGs, AM     PULM  #Acute hypoxic and hypercapnic respiratory failure  - Pt p/w dyspnea, escalated from NC -> BiPAP (EPap/IPap 25/10, fio2 70, RR 15 -> ABG: pH 7.21, pCO2 51) -> Intubation (vent settings: FiO2 100%//RR 20/PEEP 10)  -Initially though likely 2/2 fluid overload i/s/o missed HD 4/19; CXR s/f pleural effusions. Now thought AHRF 2/2 HF exac 2/2 NSTEMI- rest of plan in CV below and per CCU team  - S/p  emergent HD 4/20 AM, s/p removal of 4L; repeat CXR 4/20 after dialysis finishes in AM    CARDIOVASCULAR  #Troponemia  - Trops 0.11 on admission, repeat 0.14, CKMP 8.3,   - EKG w/ LBBB, ST depressions in I, II, III, aVF, V5, V6  - likely demand ischemia 2/2 HTN, ESRD  - cardiology consulted, recs appreciated  - trend trops    #CAD  - s/p CABG, stents  - try to obtain collateral in AM    #HTN  - home meds amlodipine, metoprolol, prn hydralazine after HD per ED note  - s/p hydralazine 10mg IVP, labetalol 20mg IVP, nitroglycerin 0.4mg sublingual x2 in ED 2/2 hypertensive urgency on admission  - currently holding home meds i/s/o sedation; restart when indicated      #R/o Heart failure  - bedside echo s/f reduced LVEF (approximately 40%), moderate AS w/ bicuspid valve  - f/u official echo  - f/u HbA1c, TSH, lipid profile  - consider pacemaker interrogation    RENAL  #ESRD  - HD M/W/F, missed Monday 4/19 session p/w SOB likely 2/2 volume overload -> s/p Lasix 80mg IVP in ED  - BUN/Cr 68/7.7 on admission   - renal consulted; recs appreciated  - plan for emergent HD o/n    #Volume status  - CXR w/ pleural effusion i/s/o of missed HD, fluid overload  - strict I&Os  - get repeat CXR post HD    #Mixed Metabolic & Respiratory Acidosis   - pH 7.21, pCO2 51, HCO3 20, Lactate 2.6  - plan for emergent HD, patient intubated  - initially w/ elevated anion gap, repeat labs: resolved; lactate wnl.  - continue to monitor, f/u repeat ABG    #Electrolyte abnormalities  - Ca 8.1 on admission; ionized Ca 1.03 (L)  - likely i/s/o ESRD, hyperphosphatemia (5.2)  - K 6.7 on repeat BMP: 2g calcium gluconate, 10 IU insulin, 1 amp D50 given  - plan for emergent HD o/n  - continue to monitor, f/u labs post HD    ENDOCRINE  #Glucose control  - currently on low dose sliding scale q6h  - f/u HbA1c  - monitor FSG  - obtain collateral for ?hx of DM    HEME  #Anemia  - Hb 12.5 on admission, MCV 88.8  - no signs of active bleeding  - most likely 2/2 ESRD  - f/u iron studies, B12, folate    #Leukocytosis  - WBC 20 (80% neutrophils) on admission, repeat CBC w/ WBC of 24  - likely reactive, however cannot r/o infection entirely (mgmt for sepsis see below)  - trend    ID  #Severe Sepsis  - meets severe sepsis criteria for , RR 38, WBC 20, Lactate 2.6 on admission  - Procalcitonin 0.20  - COVID neg x1; will need repeat COVID swab in AM given travel hx & elevated CRP, D-dimer  - UA negative for UTI; no clear source  - s/p ceftriaxone 1g, azithromycin 500 mg in ED  - started on vanc/zosyn (renally dosed: 2.25g q8h) for empiric coverage  - vanc dosed by trough, next trough due 4/21 4am  - consider d/c abx if patient remains afebrile and no source of infection is found  - f/u blood cultures    Fluids: No IVF currently needed  Electrolytes: Replete w/ caution (ESRD)  Nutrition: NPO except meds  Prophylaxis: Heparin [id number] sc  GI: Protonix 40mg IVP q24hrs  C: FC  Dispo: Admit to MICU/3WO   Mr. Grigsby is a 52 yo M w/ ESRD (HD MWF), CAD (s/p CABGx4, stents), HTN, pacemaker who p/w SOB i/s/o missed dialysis, admitted for urgent HD and management of respiratory failure s/p intubation.    NEURO  #Sedation  - propofol gtt   - trend TGs, AM     PULM  #Acute hypoxic and hypercapnic respiratory failure  - Pt p/w dyspnea, escalated from NC -> BiPAP (EPap/IPap 25/10, fio2 70, RR 15 -> ABG: pH 7.21, pCO2 51) -> Intubation (vent settings: FiO2 100%//RR 20/PEEP 10)  -Initially though likely 2/2 fluid overload i/s/o missed HD 4/19; CXR s/f pleural effusions. Now thought AHRF 2/2 HF exac 2/2 NSTEMI- rest of plan in CV below and per CCU team  - S/p  emergent HD 4/20 AM, s/p removal of 4L; repeat CXR 4/20 after dialysis finishes in AM    CARDIOVASCULAR  #Troponemia/ NSTEMI  - Trops 0.11 on admission, repeat 0.14, CKMP 8.3, . Later uptrending to 1.12 trop from 0.3  - EKG w/ LBBB, ST depressions in I, II, III, aVF, V5, V6  - s/p starting heparin gtt, 325 ASA, 300 plavix  - trend trops  -cardiology consulted, recs appreciated- Now transferred to CCU, rest of plan per CCU team    #CAD  - s/p CABG, stents  - See chart note from collateral that cold be obtained    #HTN  - home meds amlodipine, metoprolol, prn hydralazine after HD per ED note  - s/p hydralazine 10mg IVP, labetalol 20mg IVP, nitroglycerin 0.4mg sublingual x2 in ED 2/2 hypertensive urgency on admission  - currently holding home meds i/s/o sedation; restart when indicated    #R/o Heart failure  - bedside echo s/f reduced LVEF (approximately 40%), moderate AS w/ bicuspid valve  - f/u official echo  - f/u HbA1c, TSH, lipid profile  - consider pacemaker interrogation    RENAL  #ESRD  - HD M/W/F, missed Monday 4/19 session p/w SOB likely 2/2 volume overload -> s/p Lasix 80mg IVP in ED  - BUN/Cr 68/7.7 on admission   - renal consulted; recs appreciated  - s/p emergent HD ending 4/20 early AM, w/ 4L removed    #Volume status  - CXR w/ pleural effusion i/s/o of missed HD, fluid overload  - strict I&Os  - get repeat CXR post HD    #Mixed Metabolic & Respiratory Acidosis   - pH 7.21, pCO2 51, HCO3 20, Lactate 2.6  - plan for emergent HD, patient intubated  - initially w/ elevated anion gap, repeat labs: resolved; lactate wnl.  - continue to monitor, f/u repeat ABG    #Electrolyte abnormalities  - Ca 8.1 on admission; ionized Ca 1.03 (L)  - likely i/s/o ESRD, hyperphosphatemia (5.2)  - K 6.7 on repeat BMP: 2g calcium gluconate, 10 IU insulin, 1 amp D50 given  - s/p emergent HD ending 4/20 early AM, w/ 4L removed  - continue to monitor, f/u labs post HD    ENDOCRINE  #Glucose control  - currently on low dose sliding scale q6h  - f/u HbA1c  - monitor FSG  - obtain collateral for ?hx of DM    HEME  #Anemia  - Hb 12.5 on admission, MCV 88.8  - no signs of active bleeding  - most likely 2/2 ESRD  - f/u iron studies, B12, folate    #Leukocytosis  - WBC 20 (80% neutrophils) on admission, repeat CBC w/ WBC of 24  - likely reactive, however cannot r/o infection entirely (mgmt for sepsis see below)  - trend    ID  #Severe Sepsis  - meets severe sepsis criteria for , RR 38, WBC 20, Lactate 2.6 on admission  - Procalcitonin 0.20  - COVID neg x2  - UA negative for UTI; no clear source  - s/p ceftriaxone 1g, azithromycin 500 mg in ED  - started on vanc/zosyn (renally dosed: 2.25g q8h) for empiric coverage- would s/t ceftriaxone and keep on for 48 hr while awaiting bcx results  - f/u blood cultures    Fluids: No IVF currently needed  Electrolytes: Replete w/ caution (ESRD)  Nutrition: NPO except meds  Prophylaxis: Heparin [id number] sc  GI: Protonix 40mg IVP q24hrs  C: FC  Dispo: Admit to MICU/3WO   Mr. Grigsby is a 52 yo M w/ ESRD (HD MWF), CAD (s/p CABGx4, stents), HTN, pacemaker who p/w SOB i/s/o missed dialysis, admitted for urgent HD and management of respiratory failure s/p intubation.    NEURO  #Sedation  - propofol gtt   - trend TGs, AM     PULM  #Acute hypoxic and hypercapnic respiratory failure  - Pt p/w dyspnea, escalated from NC -> BiPAP (EPap/IPap 25/10, fio2 70, RR 15 -> ABG: pH 7.21, pCO2 51) -> Intubation (vent settings: FiO2 100%//RR 20/PEEP 10)  -Initially though likely 2/2 fluid overload i/s/o missed HD 4/19; CXR s/f pleural effusions. Now thought AHRF 2/2 HF exac 2/2 NSTEMI- rest of plan in CV below and per CCU team  - S/p  emergent HD 4/20 AM, s/p removal of 4L; repeat CXR 4/20 after dialysis finishes in AM    CARDIOVASCULAR  #Troponemia/ NSTEMI  - Trops 0.11 on admission, repeat 0.14, CKMP 8.3, . Later uptrending to 1.12 trop from 0.3  - EKG w/ LBBB, ST depressions in I, II, III, aVF, V5, V6  - s/p starting heparin gtt, 325 ASA, 300 plavix  - trend trops  -cardiology consulted, recs appreciated- Now transferred to CCU, rest of plan per CCU team    #CAD  - s/p CABG, stents  - See chart note from collateral that cold be obtained    #HTN  - home meds amlodipine, metoprolol, prn hydralazine after HD per ED note  - s/p hydralazine 10mg IVP, labetalol 20mg IVP, nitroglycerin 0.4mg sublingual x2 in ED 2/2 hypertensive urgency on admission  - currently holding home meds i/s/o sedation; restart when indicated    #R/o Heart failure  - bedside echo s/f reduced LVEF (approximately 40%), moderate AS w/ bicuspid valve  - f/u official echo  - f/u HbA1c, TSH, lipid profile  - consider pacemaker interrogation    RENAL  #ESRD  - HD M/W/F, missed Monday 4/19 session p/w SOB likely 2/2 volume overload -> s/p Lasix 80mg IVP in ED  - BUN/Cr 68/7.7 on admission   - renal consulted; recs appreciated  - s/p emergent HD ending 4/20 early AM, w/ 4L removed    #Volume status  - CXR w/ pleural effusion i/s/o of missed HD, fluid overload  - strict I&Os  - get repeat CXR post HD    #Mixed Metabolic & Respiratory Acidosis   - pH 7.21, pCO2 51, HCO3 20, Lactate 2.6  - plan for emergent HD, patient intubated  - initially w/ elevated anion gap, repeat labs: resolved; lactate wnl.  - continue to monitor, f/u repeat ABG    #Electrolyte abnormalities  - Ca 8.1 on admission; ionized Ca 1.03 (L)  - likely i/s/o ESRD, hyperphosphatemia (5.2)  - K 6.7 on repeat BMP: 2g calcium gluconate, 10 IU insulin, 1 amp D50 given  - s/p emergent HD ending 4/20 early AM, w/ 4L removed  - continue to monitor, f/u labs post HD    ENDOCRINE  #Glucose control  - currently on low dose sliding scale q6h  - f/u HbA1c  - monitor FSG  - obtain collateral for ?hx of DM    HEME  #Anemia  - Hb 12.5 on admission, MCV 88.8  - no signs of active bleeding  - most likely 2/2 ESRD  - f/u iron studies, B12, folate    #Leukocytosis  - WBC 20 (80% neutrophils) on admission, repeat CBC w/ WBC of 24  - likely reactive, however cannot r/o infection entirely (mgmt for sepsis see below)  - trend    ID  #Severe Sepsis  - meets severe sepsis criteria for , RR 38, WBC 20, Lactate 2.6 on admission  - Procalcitonin 0.20  - COVID neg x2  - UA negative for UTI; no clear source  - s/p ceftriaxone 1g, azithromycin 500 mg in ED  - started on vanc/zosyn (renally dosed: 2.25g q8h) for empiric coverage- would s/t ceftriaxone and keep on for 48 hr while awaiting bcx results  - f/u blood cultures    Fluids: No IVF currently needed  Electrolytes: Replete w/ caution (ESRD)  Nutrition: NPO except meds  Prophylaxis: Heparin gtt  GI: Protonix 40mg IVP q24hrs  C: FC  Dispo: [facility]   50 yo M w/ ESRD (HD MWF), CAD (s/p CABGx4, stents), HTN, pacemaker who p/w SOB i/s/o missed dialysis, admitted for urgent HD and management of respiratory failure s/p intubation 2/2 to acute of chronic decompensated heart failure, c/b by NSTEMI    NEURO  #Sedation  - propofol gtt   - trend TGs, AM     PULM  #Acute hypoxic and hypercapnic respiratory failure  - Pt p/w dyspnea, escalated from NC -> BiPAP (EPap/IPap 25/10, fio2 70, RR 15 -> ABG: pH 7.21, pCO2 51) -> Intubation (vent settings: FiO2 100%//RR 20/PEEP 10)  -Initially though likely 2/2 fluid overload i/s/o missed HD 4/19; CXR s/f pleural effusions. Now thought AHRF 2/2 HF exac 2/2 NSTEMI- rest of plan in CV below and per CCU team  - S/p  emergent HD 4/20 AM, s/p removal of 4L; repeat CXR 4/20 after dialysis finishes in AM    CARDIOVASCULAR  #Troponemia/ NSTEMI  - Trops 0.11 on admission, repeat 0.14, CKMP 8.3, . Later uptrending to 1.12 trop from 0.3  - EKG w/ LBBB, ST depressions in I, II, III, aVF, V5, V6  - s/p starting heparin gtt, 325 ASA, 300 plavix  - trend trops  -cardiology consulted, recs appreciated- Now transferred to CCU, rest of plan per CCU team    #CAD  - s/p CABG, stents  - See chart note from collateral that cold be obtained    #HTN  - home meds amlodipine, metoprolol, prn hydralazine after HD per ED note  - s/p hydralazine 10mg IVP, labetalol 20mg IVP, nitroglycerin 0.4mg sublingual x2 in ED 2/2 hypertensive urgency on admission  - currently holding home meds i/s/o sedation; restart when indicated    #R/o Heart failure  - bedside echo s/f reduced LVEF (approximately 40%), moderate AS w/ bicuspid valve  - f/u official echo  - f/u HbA1c, TSH, lipid profile  - consider pacemaker interrogation    RENAL  #ESRD  - HD M/W/F, missed Monday 4/19 session p/w SOB likely 2/2 volume overload -> s/p Lasix 80mg IVP in ED  - BUN/Cr 68/7.7 on admission   - renal consulted; recs appreciated  - s/p emergent HD ending 4/20 early AM, w/ 4L removed    #Volume status  - CXR w/ pleural effusion i/s/o of missed HD, fluid overload  - strict I&Os  - get repeat CXR post HD    #Mixed Metabolic & Respiratory Acidosis   - pH 7.21, pCO2 51, HCO3 20, Lactate 2.6  - plan for emergent HD, patient intubated  - initially w/ elevated anion gap, repeat labs: resolved; lactate wnl.  - continue to monitor, f/u repeat ABG    #Electrolyte abnormalities  - Ca 8.1 on admission; ionized Ca 1.03 (L)  - likely i/s/o ESRD, hyperphosphatemia (5.2)  - K 6.7 on repeat BMP: 2g calcium gluconate, 10 IU insulin, 1 amp D50 given  - s/p emergent HD ending 4/20 early AM, w/ 4L removed  - continue to monitor, f/u labs post HD    ENDOCRINE  #Glucose control  - currently on low dose sliding scale q6h  - f/u HbA1c  - monitor FSG  - obtain collateral for ?hx of DM    HEME  #Anemia  - Hb 12.5 on admission, MCV 88.8  - no signs of active bleeding  - most likely 2/2 ESRD  - f/u iron studies, B12, folate    #Leukocytosis  - WBC 20 (80% neutrophils) on admission, repeat CBC w/ WBC of 24  - likely reactive, however cannot r/o infection entirely (mgmt for sepsis see below)  - trend    ID  #Severe Sepsis  - meets severe sepsis criteria for , RR 38, WBC 20, Lactate 2.6 on admission  - Procalcitonin 0.20  - COVID neg x2  - UA negative for UTI; no clear source  - s/p ceftriaxone 1g, azithromycin 500 mg in ED  - started on vanc/zosyn (renally dosed: 2.25g q8h) for empiric coverage- would s/t ceftriaxone and keep on for 48 hr while awaiting bcx results  - f/u blood cultures    Fluids: No IVF currently needed  Electrolytes: Replete w/ caution (ESRD)  Nutrition: NPO except meds  Prophylaxis: Heparin gtt  GI: Protonix 40mg IVP q24hrs  C: FC  Dispo: CCU

## 2021-04-20 NOTE — CHART NOTE - NSCHARTNOTEFT_GEN_A_CORE
INFECTIOUS DISEASES BRIEF NOTE    Called to comment on COVID-19 isolation clearance.    51M h/o ESRD on HD, CAD s/p CABG, PPM p/w SOB in setting of missing a HD session yesterday.  He is visiting form [place] and unknown prior COVID history and vaccination status. He was emergently intubated for hypoxic hypercapnic respiratory failure requiring emergent dialysis.  Vital notable for rectal temp 102, hypertention, hypoxia, WBC 20 with left shift, lact 2.6.  UA neg.  CXR pulmonary edema w/o clear consolidatoin. COVID PCR neg x 3.  Mildly elevated CRP, ferritin, neg procal. Got IV azithro/CTX, lasix, BP med.   Patient also having NSTEMI.  Per team, patient also had rectal temp 102 of unclear source.      Given unclear prior history of COVID/vaccine status, symptom onset and fever source, it is difficult to r/o COVID.      - continue airborne and contact isolation  - send COVID spike and nucleocupsid ab   - repeat COVID PCR tomorrow   - obtain collateral (prior covid infection history if any and vaccination status)  - fever work-up, f/u BCx   - consider CT chest if respiratory status doesn't improve after dialysis       If you have any questions, please feel free to contact me.    Lia Dodson MD, MS  Infectious Disease attending  work cell [phone]

## 2021-04-20 NOTE — PROCEDURE NOTE - NSANATOMICLLOCATION_GEN_A_CORE
Mental Status     Alert but confused    Safety     Fall Risk    Protocols  None      ISAR     6 (01/14/17 5897)    Isolation  None  
external jugular vein/right
left/radial artery

## 2021-04-20 NOTE — ED PROVIDER NOTE - CARE PLAN
Principal Discharge DX:	Shortness of breath   Principal Discharge DX:	Shortness of breath  Secondary Diagnosis:	Volume overload  Secondary Diagnosis:	Hypertensive emergency  Secondary Diagnosis:	CAP (community acquired pneumonia)

## 2021-04-20 NOTE — AIRWAY PLACEMENT NOTE ADULT - INDICATIONS:
Respiratory Failure s/t fluid overload/Respiratory distress Respiratory Failure/Respiratory distress

## 2021-04-20 NOTE — ED ADULT TRIAGE NOTE - MEANS OF ARRIVAL
Anesthesia Post-op Note      Patient: Monica Álvarez  ANESTHESIA:  Monitor Anesthesia Care   ANESTHESIOLOGIST:  Anesthesiologist: John Barrera MD      Vital Last Value   Temperature 36.7 °C (98 °F) (02/17/21 1320)   Pulse 76 (02/17/21 1320)   Respiratory 18 (02/17/21 0903)   Non-Invasive  Blood Pressure (!) 160/72 (02/17/21 1320)   Arterial   Blood Pressure     Pulse Oximetry 99 % (02/17/21 0501)       Post-op vital signs: stable.  Level of Consciousness: participates in exam, answers questions appropriately, awake and oriented.  Respiratory: unassisted, spontaneous ventilation  Cardiovascular: stable  Hydration: euvolemic  Post-op pain: adequately controlled   Nausea: None  Airway patency: patent  Post-op assessment: Sufficiently recovered from acute administration of anesthesia effects and able to participate in evaluation. and No apparent anesthetic complications.  Complications: None.    Comments:    stretcher

## 2021-04-20 NOTE — ED PROVIDER NOTE - CONSTITUTIONAL, MLM
normal... ill appearing, awake, alert, oriented to person, place, time/situation and in respiratory distress

## 2021-04-20 NOTE — PROCEDURE NOTE - NSPOSTCAREGUIDE_GEN_A_CORE
Verbal/written post procedure instructions were given to patient/caregiver/Instructed patient/caregiver regarding signs and symptoms of infection/Keep the cast/splint/dressing clean and dry/Care for catheter as per unit/ICU protocols
Keep the cast/splint/dressing clean and dry

## 2021-04-20 NOTE — PROCEDURE NOTE - NSPROCDETAILS_GEN_ALL_CORE
location identified, draped/prepped, sterile technique used, needle inserted/introduced/positive blood return obtained via catheter/sutured in place/hemostasis with direct pressure, dressing applied/Seldinger technique/all materials/supplies accounted for at end of procedure location identified, draped/prepped, sterile technique used, needle inserted/introduced/positive blood return obtained via catheter/connected to a pressurized flush line/sutured in place/hemostasis with direct pressure, dressing applied/Seldinger technique/all materials/supplies accounted for at end of procedure

## 2021-04-20 NOTE — CONSULT NOTE ADULT - TIME BILLING
Initial attending contact date  4/20/21    . See fellow note written above for details. I reviewed the fellow documentation. I have personally seen and examined this patient. I reviewed vitals, labs, medications, cardiac studies, and additional imaging. I agree with the above fellow's findings and plans as written above with the following additions/statements.    51 year old M with PMHx of ICM with unknown baseline EF 2/2 CAD s/p CABG and PCI, Moderate AS, HTN, HLD, ESRD on HD who presented to the ER with severe SOB, found to be in Hypoxic respiratory Failure with electrolyte disturbances, s/p Urgent dialysis, now with troponemia and Ischemic EKG changes for which cardiology is being consulted.       -Pt with evolving NSTEMI with EKG changes consistent with inferolateral ischemia  -Repeat CXR with increased fluid overload  -Pt possibly with acute coronary event/NSTEMI leading to pulm edema  -Unclear if pt makes urine given ESRD  -Would trial with lasix 80 mg IV x 1, if no urine output might need addition HD session  -Treat NSTEMI with ASA/plavix load/heparin/high dose statin  -Would hold off on beta blocker given acute CHF exacerbation  -Repeat official ECHO  -Transfer to CCU for further management

## 2021-04-20 NOTE — ED PROVIDER NOTE - PROGRESS NOTE DETAILS
icu and renal consulted. some improvement with initial treatment. bp still elevated. will give hydralazine rectal temp elevated. cultures/lactate added. ceftriaxone/azithro for suspected cap. tylenol for fever. no fluids given appears volume overloaded. per renal, would like to do bedside dialysis tonight in monitored bed. icu notified

## 2021-04-20 NOTE — CHART NOTE - NSCHARTNOTEFT_GEN_A_CORE
Contacted Dr. Dr. Villar: [phone] [place], [place] as Patient's PCP, and received all the medical record from the office as a fax.   Per documents:    Patient is 52 yo M PMHx of CAD, CHF, MI on 2010 and 2012, CABG s/p stent x4, ICD/PPM, DMT2, HTN, PAD, HLP, ESRD on HD MWF, right 1st and 4th toe amputation, sleep apnea, chronic back pain on oral Dilaudid  He was presented to the ED on March 22nd complaining of atypical chest pain/arm pain in fistula placed on right arm. He was unable to finish dialysis the same day due to hand/arm cold and numbness, pain radiated across chest and down to the knee.    ECG shoed on 3/22/2021:  NSR, 1st degree AV block, ST and T wave abnormality, inferior ischemia, NO STEMI.   Labs: BUN: 68, Cr: 7.67, Troponin: 0.21, ck 404, hb:11.8, hct: 38.1%, mcv: 87, plt: 121.   CXR: NO p. Effusion, s/p CABG, right side PPM.   Pharmacological Stress ECG test 2019 negative for ST segment changes. Echo LVEF 34% on october 2019  All the work up ruled out Acute MI and hematoma in right arm AVF with vascular surgeon work up and evaluation.      FHx: Cardiac disease in mom, dad  Allergy: none  Social history: no smoker, ETOH  Patient had a visit in PCP's office on March 25th for post discharge from hospital on 3/22/2021. Contacted Dr. Villar: [phone] [place], [place] as Patient's PCP, and received all the medical record from the office as a fax.     Per documents:    Patient is 52 yo M PMHx of CAD, CHF, MI on 2010 and 2012, CABG s/p stent x4, ICD/PPM, DMT2, HTN, PAD, HLP, ESRD on HD MWF, right 1st and 4th toe amputation, sleep apnea, chronic back pain on oral Dilaudid.  He was presented to the ED on March 22nd complaining of atypical chest pain/arm pain in fistula placed on right arm. He was unable to finish dialysis the same day due to hand/arm cold and numbness, pain radiated across chest and down to the knee.    ECG shoed on 3/22/2021:  NSR, 1st degree AV block, ST and T wave abnormality, inferior ischemia, NO STEMI.   Labs: BUN: 68, Cr: 7.67, Troponin: 0.21, ck 404, hb:11.8, hct: 38.1%, mcv: 87, plt: 121.   CXR: NO p. Effusion, s/p CABG, right side PPM.   All the work up ruled out Acute MI and hematoma in right arm AVF with vascular surgeon work up and evaluation.    Pharmacological Stress ECG test 2019 negative for ST segment changes.   Echo LVEF 34% on october 2019    FHx: Cardiac disease in mom, dad  Allergy: none  Social history: no smoker, ETOH    Patient had a visit in PCP's office on March 25th for post discharge from hospital on 3/22/2021. Contacted Dr. Villar: [phone] [place], [place] as Patient's PCP, and received all the medical record from the office as a fax.     Per documents:    Patient is 52 yo M PMHx of CAD, CHF, MI on 2010 and 2012, CABG s/p stent x4, ICD/PPM, DMT2, HTN, PAD, HLP, ESRD on HD MWF, right 1st and 4th toe amputation, sleep apnea, chronic back pain on oral Dilaudid.  He was presented to the ED on March 22nd complaining of atypical chest pain/arm pain in fistula placed on right arm. He was unable to finish dialysis the same day due to hand/arm cold and numbness, pain radiated across chest and down to the knee.    ECG shoed on 3/22/2021:  NSR, 1st degree AV block, ST and T wave abnormality, inferior ischemia, NO STEMI.   Labs: BUN: 68, Cr: 7.67, Troponin: 0.21, ck 404, hb:11.8, hct: 38.1%, mcv: 87, plt: 121.   CXR: NO p. Effusion, s/p CABG, right side PPM.   All the work up ruled out Acute MI and hematoma in right arm AVF with vascular surgeon work up and evaluation.    Pharmacological Stress ECG test 2019 negative for ST segment changes.   Lexiscan LVEF 34% on october 2019    FHx: Cardiac disease in mom, dad  Allergy: none  Social history: no smoker, ETOH    Patient had a visit in PCP's office on March 25th for post discharge from hospital on 3/22/2021. Contacted Dr. Villar: [phone] [place], [place] as Patient's PCP, and received all the medical record from the office as a fax.     Per documents:    Patient is 52 yo M PMHx of CAD, CHF, MI on 2010 and 2012, CABG s/p stent x4, ICD/PPM, DMT2, HTN, PAD, HLP, ESRD on HD MWF, right 1st and 4th toe amputation, sleep apnea, chronic back pain on oral Dilaudid.  He was presented to the ED on March 22nd complaining of atypical chest pain/arm pain in AVF on right arm. He was unable to finish dialysis the same day due to hand/arm cold and numbness, pain radiated across chest and down to the knee.    ECG shoed on 3/22/2021:  NSR, 1st degree AV block, ST and T wave abnormality, inferior ischemia, NO STEMI.   Labs: BUN: 68, Cr: 7.67, Troponin: 0.21, ck 404, hb:11.8, hct: 38.1%, mcv: 87, plt: 121.   CXR: NO p. Effusion, s/p CABG, right side PPM.   All the work up ruled out Acute MI and hematoma in right arm AVF with vascular surgeon work up and evaluation.    Pharmacological Stress ECG test 2019 negative for ST segment changes.   Lexiscan LVEF 34% on october 2019    FHx: Cardiac disease in mom, dad  Allergy: none  Social history: no smoker, ETOH    Patient had a visit in PCP's office on March 25th for post discharge from hospital on 3/22/2021.

## 2021-04-21 LAB
ALBUMIN SERPL ELPH-MCNC: 3.6 G/DL — SIGNIFICANT CHANGE UP (ref 3.3–5)
ALP SERPL-CCNC: 80 U/L — SIGNIFICANT CHANGE UP (ref 40–120)
ALT FLD-CCNC: 14 U/L — SIGNIFICANT CHANGE UP (ref 10–45)
ANION GAP SERPL CALC-SCNC: 17 MMOL/L — SIGNIFICANT CHANGE UP (ref 5–17)
APTT BLD: 31.7 SEC — SIGNIFICANT CHANGE UP (ref 27.5–35.5)
APTT BLD: 59.7 SEC — HIGH (ref 27.5–35.5)
APTT BLD: 71.4 SEC — HIGH (ref 27.5–35.5)
APTT BLD: 75.9 SEC — HIGH (ref 27.5–35.5)
AST SERPL-CCNC: 19 U/L — SIGNIFICANT CHANGE UP (ref 10–40)
BASE EXCESS BLDA CALC-SCNC: -1.3 MMOL/L — SIGNIFICANT CHANGE UP (ref -2–3)
BASOPHILS # BLD AUTO: 0.06 K/UL — SIGNIFICANT CHANGE UP (ref 0–0.2)
BASOPHILS NFR BLD AUTO: 0.6 % — SIGNIFICANT CHANGE UP (ref 0–2)
BILIRUB SERPL-MCNC: 0.3 MG/DL — SIGNIFICANT CHANGE UP (ref 0.2–1.2)
BUN SERPL-MCNC: 49 MG/DL — HIGH (ref 7–23)
CALCIUM SERPL-MCNC: 7.9 MG/DL — LOW (ref 8.4–10.5)
CHLORIDE SERPL-SCNC: 94 MMOL/L — LOW (ref 96–108)
CK MB CFR SERPL CALC: 12.3 NG/ML — HIGH (ref 0–6.7)
CK MB CFR SERPL CALC: 9 NG/ML — HIGH (ref 0–6.7)
CK SERPL-CCNC: 536 U/L — HIGH (ref 30–200)
CK SERPL-CCNC: 590 U/L — HIGH (ref 30–200)
CO2 SERPL-SCNC: 23 MMOL/L — SIGNIFICANT CHANGE UP (ref 22–31)
COVID-19 NUCLEOCAPSID GAM AB INTERP: NEGATIVE — SIGNIFICANT CHANGE UP
COVID-19 NUCLEOCAPSID TOTAL GAM ANTIBODY RESULT: 0.08 INDEX — SIGNIFICANT CHANGE UP
COVID-19 SPIKE DOMAIN AB INTERP: POSITIVE
COVID-19 SPIKE DOMAIN ANTIBODY RESULT: 38.4 U/ML — HIGH
CREAT SERPL-MCNC: 6.66 MG/DL — HIGH (ref 0.5–1.3)
EOSINOPHIL # BLD AUTO: 0.16 K/UL — SIGNIFICANT CHANGE UP (ref 0–0.5)
EOSINOPHIL NFR BLD AUTO: 1.5 % — SIGNIFICANT CHANGE UP (ref 0–6)
GLUCOSE BLDC GLUCOMTR-MCNC: 126 MG/DL — HIGH (ref 70–99)
GLUCOSE BLDC GLUCOMTR-MCNC: 150 MG/DL — HIGH (ref 70–99)
GLUCOSE BLDC GLUCOMTR-MCNC: 169 MG/DL — HIGH (ref 70–99)
GLUCOSE BLDC GLUCOMTR-MCNC: 175 MG/DL — HIGH (ref 70–99)
GLUCOSE SERPL-MCNC: 155 MG/DL — HIGH (ref 70–99)
HBV SURFACE AB SER-ACNC: 10.5 MIU/ML — LOW
HCO3 BLDA-SCNC: 23 MMOL/L — SIGNIFICANT CHANGE UP (ref 21–28)
HCT VFR BLD CALC: 31.1 % — LOW (ref 39–50)
HGB BLD-MCNC: 9.9 G/DL — LOW (ref 13–17)
IMM GRANULOCYTES NFR BLD AUTO: 0.3 % — SIGNIFICANT CHANGE UP (ref 0–1.5)
INR BLD: 1.2 — HIGH (ref 0.88–1.16)
LYMPHOCYTES # BLD AUTO: 0.91 K/UL — LOW (ref 1–3.3)
LYMPHOCYTES # BLD AUTO: 8.6 % — LOW (ref 13–44)
MAGNESIUM SERPL-MCNC: 1.9 MG/DL — SIGNIFICANT CHANGE UP (ref 1.6–2.6)
MCHC RBC-ENTMCNC: 27.6 PG — SIGNIFICANT CHANGE UP (ref 27–34)
MCHC RBC-ENTMCNC: 31.8 GM/DL — LOW (ref 32–36)
MCV RBC AUTO: 86.6 FL — SIGNIFICANT CHANGE UP (ref 80–100)
MONOCYTES # BLD AUTO: 0.96 K/UL — HIGH (ref 0–0.9)
MONOCYTES NFR BLD AUTO: 9.1 % — SIGNIFICANT CHANGE UP (ref 2–14)
NEUTROPHILS # BLD AUTO: 8.45 K/UL — HIGH (ref 1.8–7.4)
NEUTROPHILS NFR BLD AUTO: 79.9 % — HIGH (ref 43–77)
NRBC # BLD: 0 /100 WBCS — SIGNIFICANT CHANGE UP (ref 0–0)
PCO2 BLDA: 39 MMHG — SIGNIFICANT CHANGE UP (ref 35–48)
PH BLDA: 7.4 — SIGNIFICANT CHANGE UP (ref 7.35–7.45)
PHOSPHATE SERPL-MCNC: 6.4 MG/DL — HIGH (ref 2.5–4.5)
PLATELET # BLD AUTO: 109 K/UL — LOW (ref 150–400)
PO2 BLDA: 133 MMHG — HIGH (ref 83–108)
POTASSIUM SERPL-MCNC: 4.2 MMOL/L — SIGNIFICANT CHANGE UP (ref 3.5–5.3)
POTASSIUM SERPL-SCNC: 4.2 MMOL/L — SIGNIFICANT CHANGE UP (ref 3.5–5.3)
PROT SERPL-MCNC: 6.7 G/DL — SIGNIFICANT CHANGE UP (ref 6–8.3)
PROTHROM AB SERPL-ACNC: 14.3 SEC — HIGH (ref 10.6–13.6)
RBC # BLD: 3.59 M/UL — LOW (ref 4.2–5.8)
RBC # FLD: 15.1 % — HIGH (ref 10.3–14.5)
SAO2 % BLDA: 98 % — SIGNIFICANT CHANGE UP (ref 95–100)
SARS-COV-2 IGG+IGM SERPL QL IA: 0.08 INDEX — SIGNIFICANT CHANGE UP
SARS-COV-2 IGG+IGM SERPL QL IA: 38.4 U/ML — HIGH
SARS-COV-2 IGG+IGM SERPL QL IA: NEGATIVE — SIGNIFICANT CHANGE UP
SARS-COV-2 IGG+IGM SERPL QL IA: POSITIVE
SARS-COV-2 RNA SPEC QL NAA+PROBE: SIGNIFICANT CHANGE UP
SODIUM SERPL-SCNC: 134 MMOL/L — LOW (ref 135–145)
TROPONIN T SERPL-MCNC: 1.39 NG/ML — CRITICAL HIGH (ref 0–0.01)
TROPONIN T SERPL-MCNC: 1.72 NG/ML — CRITICAL HIGH (ref 0–0.01)
VIT B12 SERPL-MCNC: 719 PG/ML — SIGNIFICANT CHANGE UP (ref 232–1245)
WBC # BLD: 10.57 K/UL — HIGH (ref 3.8–10.5)
WBC # FLD AUTO: 10.57 K/UL — HIGH (ref 3.8–10.5)

## 2021-04-21 PROCEDURE — 71045 X-RAY EXAM CHEST 1 VIEW: CPT | Mod: 26,59

## 2021-04-21 PROCEDURE — 99292 CRITICAL CARE ADDL 30 MIN: CPT

## 2021-04-21 PROCEDURE — 99233 SBSQ HOSP IP/OBS HIGH 50: CPT

## 2021-04-21 PROCEDURE — 99222 1ST HOSP IP/OBS MODERATE 55: CPT

## 2021-04-21 PROCEDURE — 99291 CRITICAL CARE FIRST HOUR: CPT

## 2021-04-21 RX ORDER — CARVEDILOL PHOSPHATE 80 MG/1
6.25 CAPSULE, EXTENDED RELEASE ORAL EVERY 12 HOURS
Refills: 0 | Status: DISCONTINUED | OUTPATIENT
Start: 2021-04-21 | End: 2021-04-23

## 2021-04-21 RX ORDER — FENTANYL CITRATE 50 UG/ML
0.5 INJECTION INTRAVENOUS
Qty: 2500 | Refills: 0 | Status: DISCONTINUED | OUTPATIENT
Start: 2021-04-21 | End: 2021-04-22

## 2021-04-21 RX ORDER — IRON SUCROSE 20 MG/ML
200 INJECTION, SOLUTION INTRAVENOUS ONCE
Refills: 0 | Status: COMPLETED | OUTPATIENT
Start: 2021-04-21 | End: 2021-04-21

## 2021-04-21 RX ORDER — HEPARIN SODIUM 5000 [USP'U]/ML
1000 INJECTION INTRAVENOUS; SUBCUTANEOUS
Qty: 25000 | Refills: 0 | Status: DISCONTINUED | OUTPATIENT
Start: 2021-04-21 | End: 2021-04-21

## 2021-04-21 RX ORDER — HEPARIN SODIUM 5000 [USP'U]/ML
1800 INJECTION INTRAVENOUS; SUBCUTANEOUS
Qty: 25000 | Refills: 0 | Status: DISCONTINUED | OUTPATIENT
Start: 2021-04-21 | End: 2021-04-22

## 2021-04-21 RX ORDER — CARVEDILOL PHOSPHATE 80 MG/1
6.25 CAPSULE, EXTENDED RELEASE ORAL EVERY 12 HOURS
Refills: 0 | Status: DISCONTINUED | OUTPATIENT
Start: 2021-04-21 | End: 2021-04-21

## 2021-04-21 RX ADMIN — Medication 81 MILLIGRAM(S): at 11:41

## 2021-04-21 RX ADMIN — FENTANYL CITRATE 6.04 MICROGRAM(S)/KG/HR: 50 INJECTION INTRAVENOUS at 23:21

## 2021-04-21 RX ADMIN — INSULIN HUMAN 2: 100 INJECTION, SOLUTION SUBCUTANEOUS at 23:11

## 2021-04-21 RX ADMIN — PROPOFOL 3.27 MICROGRAM(S)/KG/MIN: 10 INJECTION, EMULSION INTRAVENOUS at 03:04

## 2021-04-21 RX ADMIN — HEPARIN SODIUM 10 UNIT(S)/HR: 5000 INJECTION INTRAVENOUS; SUBCUTANEOUS at 01:55

## 2021-04-21 RX ADMIN — CEFTRIAXONE 100 MILLIGRAM(S): 500 INJECTION, POWDER, FOR SOLUTION INTRAMUSCULAR; INTRAVENOUS at 17:17

## 2021-04-21 RX ADMIN — PROPOFOL 3.27 MICROGRAM(S)/KG/MIN: 10 INJECTION, EMULSION INTRAVENOUS at 10:25

## 2021-04-21 RX ADMIN — PROPOFOL 3.27 MICROGRAM(S)/KG/MIN: 10 INJECTION, EMULSION INTRAVENOUS at 15:40

## 2021-04-21 RX ADMIN — HEPARIN SODIUM 18 UNIT(S)/HR: 5000 INJECTION INTRAVENOUS; SUBCUTANEOUS at 03:06

## 2021-04-21 RX ADMIN — CARVEDILOL PHOSPHATE 6.25 MILLIGRAM(S): 80 CAPSULE, EXTENDED RELEASE ORAL at 23:11

## 2021-04-21 RX ADMIN — CHLORHEXIDINE GLUCONATE 15 MILLILITER(S): 213 SOLUTION TOPICAL at 17:26

## 2021-04-21 RX ADMIN — HEPARIN SODIUM 18 UNIT(S)/HR: 5000 INJECTION INTRAVENOUS; SUBCUTANEOUS at 19:52

## 2021-04-21 RX ADMIN — ATORVASTATIN CALCIUM 80 MILLIGRAM(S): 80 TABLET, FILM COATED ORAL at 11:41

## 2021-04-21 RX ADMIN — CLOPIDOGREL BISULFATE 75 MILLIGRAM(S): 75 TABLET, FILM COATED ORAL at 11:42

## 2021-04-21 RX ADMIN — PROPOFOL 3.27 MICROGRAM(S)/KG/MIN: 10 INJECTION, EMULSION INTRAVENOUS at 06:53

## 2021-04-21 RX ADMIN — CHLORHEXIDINE GLUCONATE 1 APPLICATION(S): 213 SOLUTION TOPICAL at 05:35

## 2021-04-21 RX ADMIN — INSULIN HUMAN 2: 100 INJECTION, SOLUTION SUBCUTANEOUS at 11:31

## 2021-04-21 RX ADMIN — CHLORHEXIDINE GLUCONATE 15 MILLILITER(S): 213 SOLUTION TOPICAL at 05:35

## 2021-04-21 RX ADMIN — PROPOFOL 3.27 MICROGRAM(S)/KG/MIN: 10 INJECTION, EMULSION INTRAVENOUS at 17:38

## 2021-04-21 RX ADMIN — PANTOPRAZOLE SODIUM 40 MILLIGRAM(S): 20 TABLET, DELAYED RELEASE ORAL at 05:35

## 2021-04-21 RX ADMIN — IRON SUCROSE 110 MILLIGRAM(S): 20 INJECTION, SOLUTION INTRAVENOUS at 16:42

## 2021-04-21 NOTE — PROVIDER CONTACT NOTE (OTHER) - RECOMMENDATIONS
CCU intern recommended to have heparin infused in peripheral IV line in left arm, since previous PTT results have been in the 30's while heparin running through Right EJ.

## 2021-04-21 NOTE — PROVIDER CONTACT NOTE (OTHER) - ACTION/TREATMENT ORDERED:
Heparin drip was switched to left lower arm peripheral IV line, and at a dose at 1000 units/h as ordered.

## 2021-04-21 NOTE — CHART NOTE - NSCHARTNOTEFT_GEN_A_CORE
INFECTIOUS DISEASES BRIEF NOTE    Called to comment on COVID-19 isolation clearance.    51M h/o ESRD on HD, CAD s/p CABG, PPM p/w SOB in setting of missing a HD session yesterday.  He is visiting form [place] and unknown prior COVID history and vaccination status. He was emergently intubated for hypoxic hypercapnic respiratory failure requiring emergent dialysis.  Vital reportedly notable for rectal temp 102, hypertention, hypoxia, WBC 20 with left shift, lact 2.6.  UA neg.  CXR pulmonary edema w/o clear consolidatoin. COVID PCR neg x 3.  Mildly elevated CRP, ferritin, neg procal. Got IV azithro/CTX, lasix, BP med.   Patient also having NSTEMI.  He was initially placed on COVID isolation.  Over the course, he received 1 dialysis session with significant respiratory improvement.  CXR showed much improved pulmonary congestion, now he is on 30% oxygen and team plans to extubate him tomorrow.  He is also COVID spike protein positive, nucleocapsid negative, and patient had COVID vaccine 2 weeks ago.      Given significant respiratorily status improvement with one HD session, his hypoxic respiratory failure likely due to fluid overload 2/2 missed dialysis session.  he is COVID-19 PCR neg x3, COVID ab positive.  It is reasonable to discontinue isolation and move to regular floor.       If you have any questions, please feel free to contact me.    Lia Dodson MD, MS  Infectious Disease attending  work cell [phone] INFECTIOUS DISEASES BRIEF NOTE    Called to comment on COVID-19 isolation clearance.    51M h/o ESRD on HD, CAD s/p CABG, PPM p/w SOB in setting of missing a HD session yesterday.  He is visiting [facility] on 4/17 and unknown prior COVID history and vaccination status. He was emergently intubated for hypoxic hypercapnic respiratory failure requiring emergent dialysis.  Vital reportedly notable for rectal temp 102, hypertention, hypoxia, WBC 20 with left shift, lact 2.6.  UA neg.  CXR pulmonary edema w/o clear consolidatoin. COVID PCR neg x 3.  Mildly elevated CRP, ferritin, neg procal. Got IV azithro/CTX, lasix, BP med.   Patient also having NSTEMI.  He was placed on COVID isolation to r/o COVID.  Over the course, he received 1 dialysis session with significant respiratory improvement.  CXR showed much improved pulmonary congestion, now he is on 30% oxygen and team plans to extubate him tomorrow.  He is also COVID spike protein positive, nucleocapsid negative, and patient had the first COVID vaccine 2 weeks ago.  He spiked 100.7 fever this morning, unclear source of fever.      Given significant respiratorily status improvement with one HD session, his hypoxic respiratory failure likely due to fluid overload 2/2 missed dialysis session.  However, the cause of fever remains still unknown.  He also traveled from [place] to [facility] on 4/17.  Based on the hospital guideline on domestic traveler, he needs to remain on isolation for at least 7 days (may need to remain on isolation for 14 days - will discuss with epidemiology office tomorrow).  Continue fever work-up.     OK to move patient to CCU and keep him on airborne and contact isolation.     If you have any questions, please feel free to contact me.    Lia Dodson MD, MS  Infectious Disease attending  work cell [phone] INFECTIOUS DISEASES BRIEF NOTE    Called to comment on COVID-19 isolation clearance.    51M h/o ESRD on HD, CAD s/p CABG, PPM p/w SOB in setting of missing a HD session yesterday.  He is visiting [facility] on 4/17 and unknown prior COVID history and vaccination status. He was emergently intubated for hypoxic hypercapnic respiratory failure requiring emergent dialysis.  Vital reportedly notable for rectal temp 102, hypertention, hypoxia, WBC 20 with left shift, lact 2.6.  UA neg.  CXR pulmonary edema w/o clear consolidatoin. COVID PCR neg x 3.  Mildly elevated CRP, ferritin, neg procal. Got IV azithro/CTX, lasix, BP med.   Patient also having NSTEMI.  He was placed on COVID isolation to r/o COVID.  Over the course, he received 1 dialysis session with significant respiratory improvement.  CXR showed much improved pulmonary congestion, now he is on 30% oxygen and team plans to extubate him tomorrow.  He is also COVID spike protein positive, nucleocapsid negative, and patient had the first COVID vaccine 2 weeks ago.  He spiked 100.7 fever this morning, unclear source of fever.      Given significant respiratorily status improvement with one HD session, his hypoxic respiratory failure likely due to fluid overload 2/2 missed dialysis session.  However, the cause of fever remains still unknown.  He also traveled from [place] to [facility] on 4/17.  Based on the hospital guideline on domestic traveler, he needs to remain on isolation for at least 7 days (may need to remain on isolation for 14 days - will discuss with epidemiology office tomorrow).  Continue fever work-up.     OK to move patient to CCU and keep him on droplet and contact isolation.     If you have any questions, please feel free to contact me.    Lia Dodson MD, MS  Infectious Disease attending  work cell [phone] INFECTIOUS DISEASES BRIEF NOTE    Called to comment on COVID-19 isolation clearance.    51M h/o ESRD on HD, CAD s/p CABG, PPM p/w SOB in setting of missing a HD session yesterday.  He is visiting [facility] on 4/17 and unknown prior COVID history and vaccination status. He was emergently intubated for hypoxic hypercapnic respiratory failure requiring emergent dialysis.  Vital reportedly notable for rectal temp 102, hypertention, hypoxia, WBC 20 with left shift, lact 2.6.  UA neg.  CXR pulmonary edema w/o clear consolidatoin. COVID PCR neg x 3.  Mildly elevated CRP, ferritin, neg procal. Got IV azithro/CTX, lasix, BP med.   Patient also having NSTEMI.  He was placed on COVID isolation to r/o COVID.  Over the course, he received 1 dialysis session with significant respiratory improvement.  CXR showed much improved pulmonary congestion, now he is on 30% oxygen and team plans to extubate him tomorrow.  He is also COVID spike protein positive, nucleocapsid negative, and patient had the first COVID vaccine 2 weeks ago.  He spiked 100.7 fever this morning, unclear source of fever.      Given significant respiratorily status improvement with one HD session, his hypoxic respiratory failure likely due to fluid overload 2/2 missed dialysis session.  However, the cause of fever remains still unknown.  He also traveled from [place] to [facility] on 4/17.  Based on the hospital guideline on domestic traveler, he needs to remain on isolation for at least 7 days (may need to remain on isolation for 14 days - will discuss with epidemiology office tomorrow).  Continue fever work-up.     OK to move patient to CCU and keep him on droplet and contact isolation.     **Addendum**  Discussed with epi office.  If patient is found to have alternative cause of fever and the possibility of COVID is ruled out, then can transition to droplet isolation for 14 days while in-house.  Meanwhile, maintain doplet/contact isolation for r/o COVID.     If you have any questions, please feel free to contact me.    Lia Dodson MD, MS  Infectious Disease attending  work cell [phone]

## 2021-04-21 NOTE — PROGRESS NOTE ADULT - ASSESSMENT
50 yo M w/ ESRD (HD MWF), CAD (s/p CABGx4, stents), HTN, pacemaker who p/w SOB i/s/o missed dialysis, admitted for urgent HD and management of respiratory failure s/p intubation 2/2 to acute of chronic decompensated heart failure, c/b by NSTEMI    NEURO  #Sedation  - propofol gtt   - trend TGs, AM     PULM  #Acute hypoxic and hypercapnic respiratory failure  - Pt p/w dyspnea, escalated from NC -> BiPAP (EPap/IPap 25/10, fio2 70, RR 15 -> ABG: pH 7.21, pCO2 51) -> Intubation (vent settings: FiO2 100%//RR 20/PEEP 10)  -Initially though likely 2/2 fluid overload i/s/o missed HD 4/19; CXR s/f pleural effusions. Now thought AHRF 2/2 HF exac 2/2 NSTEMI- rest of plan in CV below and per CCU team  - S/p  emergent HD 4/20 AM, s/p removal of 4L; repeat CXR 4/20 after dialysis finishes in AM    CARDIOVASCULAR  #Troponemia/ NSTEMI  - Trops 0.11 on admission, repeat 0.14, CKMP 8.3, . Later uptrending to 1.12 trop from 0.3  - EKG w/ LBBB, ST depressions in I, II, III, aVF, V5, V6  - s/p starting heparin gtt, 325 ASA, 300 plavix  - trend trops  -cardiology consulted, recs appreciated- Now transferred to CCU, rest of plan per CCU team    #CAD  - s/p CABG, stents  - See chart note from collateral that cold be obtained    #HTN  - home meds amlodipine, metoprolol, prn hydralazine after HD per ED note  - s/p hydralazine 10mg IVP, labetalol 20mg IVP, nitroglycerin 0.4mg sublingual x2 in ED 2/2 hypertensive urgency on admission  - currently holding home meds i/s/o sedation; restart when indicated    #R/o Heart failure  - bedside echo s/f reduced LVEF (approximately 40%), moderate AS w/ bicuspid valve  - f/u official echo  - f/u HbA1c, TSH, lipid profile  - consider pacemaker interrogation    RENAL  #ESRD  - HD M/W/F, missed Monday 4/19 session p/w SOB likely 2/2 volume overload -> s/p Lasix 80mg IVP in ED  - BUN/Cr 68/7.7 on admission   - renal consulted; recs appreciated  - s/p emergent HD ending 4/20 early AM, w/ 4L removed    #Volume status  - CXR w/ pleural effusion i/s/o of missed HD, fluid overload  - strict I&Os  - get repeat CXR post HD    #Mixed Metabolic & Respiratory Acidosis   - pH 7.21, pCO2 51, HCO3 20, Lactate 2.6  - plan for emergent HD, patient intubated  - initially w/ elevated anion gap, repeat labs: resolved; lactate wnl.  - continue to monitor, f/u repeat ABG    #Electrolyte abnormalities  - Ca 8.1 on admission; ionized Ca 1.03 (L)  - likely i/s/o ESRD, hyperphosphatemia (5.2)  - K 6.7 on repeat BMP: 2g calcium gluconate, 10 IU insulin, 1 amp D50 given  - s/p emergent HD ending 4/20 early AM, w/ 4L removed  - continue to monitor, f/u labs post HD    ENDOCRINE  #Glucose control  - currently on low dose sliding scale q6h  - f/u HbA1c  - monitor FSG  - obtain collateral for ?hx of DM    HEME  #Anemia  - Hb 12.5 on admission, MCV 88.8  - no signs of active bleeding  - most likely 2/2 ESRD  - f/u iron studies, B12, folate    #Leukocytosis  - WBC 20 (80% neutrophils) on admission, repeat CBC w/ WBC of 24  - likely reactive, however cannot r/o infection entirely (mgmt for sepsis see below)  - trend    ID  #Severe Sepsis  - meets severe sepsis criteria for , RR 38, WBC 20, Lactate 2.6 on admission  - Procalcitonin 0.20  - COVID neg x2  - UA negative for UTI; no clear source  - s/p ceftriaxone 1g, azithromycin 500 mg in ED  - started on vanc/zosyn (renally dosed: 2.25g q8h) for empiric coverage- would s/t ceftriaxone and keep on for 48 hr while awaiting bcx results  - f/u blood cultures    Fluids: No IVF currently needed  Electrolytes: Replete w/ caution (ESRD)  Nutrition: NPO except meds  Prophylaxis: Heparin gtt  GI: Protonix 40mg IVP q24hrs  C: FC  Dispo: CCU   50 yo M w/ ESRD (HD MWF), CAD (s/p CABGx4, stents), HTN, pacemaker who p/w SOB i/s/o missed dialysis, admitted for urgent HD and management of respiratory failure s/p intubation 2/2 to acute of chronic decompensated heart failure, c/b by NSTEMI    NEURO  #Sedation  - propofol gtt   - trend TGs every other other day       CARDIOVASCULAR  #Troponemia/ NSTEMI  - Cardiac enzyme uptrending to 1.12 trop from 0.3, today improved   - On admission EKG w/ LBBB, ST depressions in I, II, III, aVF, V5, V6, Today ECG ST depression and TWI in I, II, AVL, V3, V4,V5,V6.  - trend trops  - c/w ASA 81, Plavix 75      #CAD  - s/p CABG, stents x [id number]  - See chart note from collateral     #HTN  - home meds amlodipine, metoprolol, prn hydralazine after HD  - s/p hydralazine 10mg IVP, labetalol 20mg IVP, nitroglycerin 0.4mg sublingual x2 in ED 2/2 hypertensive urgency on admission  -Coreg 6.25 BID       #R/o Heart failure  - bedside echo s/f reduced LVEF (approximately 40%), moderate AS w/ bicuspid valve  - f/u official echo after transfer from COVID unit to CCU   - HbA1c, 7.9  - Monitor Lipid panel every other day   - consider pacemaker interrogation      PULM  #Acute hypoxic and hypercapnic respiratory failure  - Intubated in ED due to respiratory failure   -likely 2/2 fluid overload i/s/o missed HD 4/19; CXR s/f pleural effusions vs CHF exacerbation   - S/p  emergent HD 4/20 AM, s/p removal of 4L; repeat CXR 4/20 after dialysis still p. Effuion  -Plan for HD on 4/21  -Plan for extubation tomorrow if p.effusion improved after HD     RENAL  #ESRD  - HD M/W/F, missed Monday 4/19 session p/w SOB likely 2/2 volume overload -> s/p Lasix 80mg IVP in ED  - BUN/Cr 68/7.7 on admission   - renal consulted; recs appreciated  - HD ending 4/20 removed 4 L and will get HD 4/21 one more session      #Volume status  - CXR w/ pleural effusion i/s/o of missed HD, fluid overload  - strict I&Os  - get repeat CXR post HD    #Mixed Metabolic & Respiratory Acidosis   - On arrival pH 7.21, pCO2 51, HCO3 20, Lactate 2.6  - On 4/20 emergent HD, patient intubated  - initially w/ elevated anion gap, repeat labs: resolved; lactate wnl.  - continue to monitor, f/u repeat ABG daily     #Electrolyte abnormalities  - Ca 8.1 on admission; ionized Ca 1.03 (L)  - likely i/s/o ESRD, hyperphosphatemia (5.2)  - Hyperkalemia improved   - s/p emergent HD ending 4/20 early AM, w/ 4L removed  - continue to monitor, need one more HD today     ENDOCRINE  #Glucose control  -DMT2 on Home insulin   - currently on low dose sliding scale q6h  - HbA1c 7.9  - monitor FSG daily       HEME  #Anemia  - Hb 12.5 on admission, MCV 88.8  - no signs of active bleeding  - most likely 2/2 ESRD    #Leukocytosis  - WBC 20 (80% neutrophils) on admission, repeat CBC w/ WBC of 24  - likely reactive, however cannot r/o infection entirely (mgmt for sepsis see below)  - trend    ID  #Severe Sepsis  - met severe sepsis criteria for , RR 38, WBC 20, Lactate 2.6 on admission  - Procalcitonin 0.20  - COVID neg x2, COVID ab +, COVID Neucleocapsid negative, per pt's sister, pt got vaccine 2 weeks ago , Per ID clear to transfer to non COVID unit   - UA negative for UTI; no clear source  - would s/t ceftriaxone and keep on for 48 hr while awaiting bcx results  - f/u blood cultures    Fluids: No IVF currently needed  Electrolytes: Replete w/ caution (ESRD)  Nutrition: NPO except meds  Prophylaxis: Heparin gtt  GI: Protonix 40mg IVP q24hrs  C: FC  Dispo: CCU   52 yo M w/ ESRD (HD MWF), CAD (s/p CABGx4, stents), HTN, pacemaker who p/w SOB i/s/o missed dialysis, admitted for urgent HD and management of respiratory failure s/p intubation 2/2 to acute of chronic decompensated heart failure, c/b by NSTEMI    NEURO  #Sedation  - propofol gtt   - trend TGs every other other day       CARDIOVASCULAR  #Troponemia/ NSTEMI  - Cardiac enzyme uptrending to 1.12 trop from 0.3, today improved   - On admission EKG w/ LBBB, ST depressions in I, II, III, aVF, V5, V6, Today ECG ST depression and TWI in I, II, AVL, V3, V4,V5,V6.  - trend trops  - c/w ASA 81, Plavix 75      #CAD  - s/p CABG, stents x [id number]  - See chart note from collateral     #HTN  - home meds amlodipine, metoprolol, prn hydralazine after HD  - s/p hydralazine 10mg IVP, labetalol 20mg IVP, nitroglycerin 0.4mg sublingual x2 in ED 2/2 hypertensive urgency on admission  -Coreg 6.25 BID       #R/o Heart failure  - bedside echo s/f reduced LVEF (approximately 40%), moderate AS w/ bicuspid valve  - f/u official echo after transfer from COVID unit to CCU   - HbA1c, 7.9  - Monitor Lipid panel every other day   - consider pacemaker interrogation      PULM  #Acute hypoxic and hypercapnic respiratory failure  - Intubated in ED due to respiratory failure   -likely 2/2 fluid overload i/s/o missed HD 4/19; CXR s/f pleural effusions vs CHF exacerbation   - S/p  emergent HD 4/20 AM, s/p removal of 4L; repeat CXR 4/20 after dialysis still p. Effuion  -Plan for HD on 4/21  -Plan for extubation tomorrow if p.effusion improved after HD     RENAL  #ESRD  - HD M/W/F, missed Monday 4/19 session p/w SOB likely 2/2 volume overload -> s/p Lasix 80mg IVP in ED  - BUN/Cr 68/7.7 on admission   - renal consulted; recs appreciated  - HD 4/20 removed 4 L and will get HD 4/21 one more session      #Volume status  - CXR w/ pleural effusion i/s/o of missed HD, fluid overload  - strict I&Os  - get repeat CXR post HD    #Mixed Metabolic & Respiratory Acidosis   - On arrival pH 7.21, pCO2 51, HCO3 20, Lactate 2.6  - On 4/20 emergent HD, patient intubated  - initially w/ elevated anion gap, repeat labs: resolved; lactate wnl.  - continue to monitor, f/u repeat ABG daily     #Electrolyte abnormalities  - Ca 8.1 on admission; ionized Ca 1.03 (L)  - likely i/s/o ESRD, hyperphosphatemia (5.2)  - Hyperkalemia improved   - s/p em,ergent HD ending 4/20  w/ 4L removed  - HD today     ENDOCRINE  #Glucose control  -DMT2 on Home insulin   - currently on low dose sliding scale q6h  - HbA1c 7.9  - monitor FSG daily       HEME  #Anemia  - Hb 12.5 on admission, MCV 88.8  - no signs of active bleeding  - most likely 2/2 ESRD    #Leukocytosis  - WBC 20 (80% neutrophils) on admission, repeat CBC w/ WBC of 24  - likely reactive, however cannot r/o infection entirely (mgmt for sepsis see below)  - trend    ID  #Severe Sepsis  - met severe sepsis criteria for , RR 38, WBC 20, Lactate 2.6 on admission  - Procalcitonin 0.20  - COVID neg x2, COVID ab +, COVID Neucleocapsid negative, per pt's sister, pt got vaccine 2 weeks ago , Per ID clear to transfer to non COVID unit   - UA negative for UTI; no clear source  - would s/t ceftriaxone and keep on for 48 hr while awaiting bcx results  - f/u blood cultures    Fluids: No IVF currently needed  Electrolytes: Replete w/ caution (ESRD)  Nutrition: NPO except meds  Prophylaxis: Heparin gtt  GI: Protonix 40mg IVP q24hrs  C: FC  Dispo: CCU

## 2021-04-21 NOTE — PROGRESS NOTE ADULT - SUBJECTIVE AND OBJECTIVE BOX
OVERNIGHT EVENTS:    SUBJECTIVE / INTERVAL HPI: Patient seen and examined at bedside.     VITAL SIGNS:  Vital Signs Last 24 Hrs  T(C): 37.3 (2021 05:00), Max: 37.3 (2021 05:00)  T(F): 99.2 (2021 05:00), Max: 99.2 (2021 05:00)  HR: 86 (2021 08:00) (76 - 89)  BP: 145/66 (2021 21:00) (78/51 - 153/70)  BP(mean): 95 (2021 21:00) (58 - 100)  RR: 16 (2021 08:00) (16 - 21)  SpO2: 98% (2021 08:00) (98% - 100%)    PHYSICAL EXAM:    General: Well developed, well nourished, no acute distress  HEENT: NC/AT; PERRL, anicteric sclera; MMM  Neck: supple  Cardiovascular: +S1/S2, RRR, no murmurs, rubs, gallops  Respiratory: CTA B/L; no W/R/R  Gastrointestinal: soft, NT/ND; +BSx4  Extremities: WWP; no edema, clubbing or cyanosis  Vascular: 2+ radial, DP/PT pulses B/L  Neurological: AAOx3; no focal deficits    MEDICATIONS:  MEDICATIONS  (STANDING):  aspirin  chewable 81 milliGRAM(s) Oral daily  atorvastatin 80 milliGRAM(s) Oral every 24 hours  cefTRIAXone   IVPB 2000 milliGRAM(s) IV Intermittent every 24 hours  chlorhexidine 0.12% Liquid 15 milliLiter(s) Oral Mucosa every 12 hours  chlorhexidine 2% Cloths 1 Application(s) Topical <User Schedule>  clopidogrel Tablet 75 milliGRAM(s) Oral every 24 hours  dextrose 40% Gel 15 Gram(s) Oral once  dextrose 5%. 1000 milliLiter(s) (50 mL/Hr) IV Continuous <Continuous>  dextrose 5%. 1000 milliLiter(s) (100 mL/Hr) IV Continuous <Continuous>  dextrose 50% Injectable 25 Gram(s) IV Push once  dextrose 50% Injectable 12.5 Gram(s) IV Push once  dextrose 50% Injectable 25 Gram(s) IV Push once  glucagon  Injectable 1 milliGRAM(s) IntraMuscular once  heparin  Infusion 1800 Unit(s)/Hr (18 mL/Hr) IV Continuous <Continuous>  insulin regular  human corrective regimen sliding scale   SubCutaneous every 6 hours  iron sucrose IVPB 200 milliGRAM(s) IV Intermittent once  norepinephrine Infusion 0.05 MICROgram(s)/kG/Min (10.2 mL/Hr) IV Continuous <Continuous>  pantoprazole  Injectable 40 milliGRAM(s) IV Push every 24 hours  propofol Infusion 5 MICROgram(s)/kG/Min (3.27 mL/Hr) IV Continuous <Continuous>    MEDICATIONS  (PRN):      ALLERGIES:  Allergies    No Known Allergies    Intolerances        LABS:                        9.9    10.57 )-----------( 109      ( 2021 06:19 )             31.1     04-    134<L>  |  94<L>  |  49<H>  ----------------------------<  155<H>  4.2   |  23  |  6.66<H>    Ca    7.9<L>      2021 06:19  Phos  6.4     -  Mg     1.9     -    TPro  6.7  /  Alb  3.6  /  TBili  0.3  /  DBili  x   /  AST  19  /  ALT  14  /  AlkPhos  80  04-21    PT/INR - ( 2021 06:19 )   PT: 14.3 sec;   INR: 1.20          PTT - ( 2021 06:19 )  PTT:59.7 sec  Urinalysis Basic - ( 2021 01:45 )    Color: Yellow / Appearance: Clear / S.025 / pH: x  Gluc: x / Ketone: NEGATIVE  / Bili: Negative / Urobili: 0.2 E.U./dL   Blood: x / Protein: 100 mg/dL / Nitrite: NEGATIVE   Leuk Esterase: NEGATIVE / RBC: < 5 /HPF / WBC < 5 /HPF   Sq Epi: x / Non Sq Epi: 0-5 /HPF / Bacteria: Present /HPF      CAPILLARY BLOOD GLUCOSE      POCT Blood Glucose.: 150 mg/dL (2021 05:32)      RADIOLOGY & ADDITIONAL TESTS: Reviewed.    PLAN:  OVERNIGHT EVENTS: O/N Troponin slightly up trending but started to plateua. PTT overnight 31. Changed heparin drip to different line, and since 3AM running at 18cc (from 15cc)/hr.     SUBJECTIVE / INTERVAL HPI: Patient seen and examined at bedside. He was lying in bed intubated. Due to sedation unable to elicit ROS.     VITAL SIGNS:  Vital Signs Last 24 Hrs  T(C): 37.3 (2021 05:00), Max: 37.3 (2021 05:00)  T(F): 99.2 (2021 05:00), Max: 99.2 (2021 05:00)  HR: 86 (2021 08:00) (76 - 89)  BP: 145/66 (2021 21:00) (78/51 - 153/70)  BP(mean): 95 (2021 21:00) (58 - 100)  RR: 16 (2021 08:00) (16 - 21)  SpO2: 98% (2021 08:00) (98% - 100%)    PHYSICAL EXAM:    Constitutional: middle-aged male, intubated, sedated  Head: NC/AT  Eyes: PERRL, anicteric sclera  ENT: no nasal discharge; intubated, NGT placed 420 AM  Neck: supple, REJ placed 4/20 AM  Respiratory: Anterior ascultation CTA B/L; no W/R/R, no retractions; ETT in place, breathing in sync w/ vent  Cardiac: +S1/S2; RRR; no M/R/G; PMI non-displaced; S/p CABG old scares  Gastrointestinal: soft, distended; +BSx4;   Genitourinary: normal external genitalia; [place] cath in place   Extremities: no clubbing or cyanosis; 1+ peripheral edema b/l knees; feet b/l cold L>R; s/p well-healed L toe amputation; R arm w/ fistula for dialysis  Musculoskeletal: no joint swelling or erythema, Right femoral scare s/p CABG vein removal   Vascular: 2+ radial, femoral pulses B/L; weak DP/PT pulses  Dermatologic: skin warm, dry and intact; no rashes, wounds; b/l LE hyperpigmented up to mid-tibia; multiple scars from ?surgeries w/ some hypertrophic scar tissue/?keloid noted on chest  Lymphatic: no submandibular or cervical LAD  Neurologic: sedated    MEDICATIONS:  MEDICATIONS  (STANDING):  aspirin  chewable 81 milliGRAM(s) Oral daily  atorvastatin 80 milliGRAM(s) Oral every 24 hours  cefTRIAXone   IVPB 2000 milliGRAM(s) IV Intermittent every 24 hours  chlorhexidine 0.12% Liquid 15 milliLiter(s) Oral Mucosa every 12 hours  chlorhexidine 2% Cloths 1 Application(s) Topical <User Schedule>  clopidogrel Tablet 75 milliGRAM(s) Oral every 24 hours  dextrose 40% Gel 15 Gram(s) Oral once  dextrose 5%. 1000 milliLiter(s) (50 mL/Hr) IV Continuous <Continuous>  dextrose 5%. 1000 milliLiter(s) (100 mL/Hr) IV Continuous <Continuous>  dextrose 50% Injectable 25 Gram(s) IV Push once  dextrose 50% Injectable 12.5 Gram(s) IV Push once  dextrose 50% Injectable 25 Gram(s) IV Push once  glucagon  Injectable 1 milliGRAM(s) IntraMuscular once  heparin  Infusion 1800 Unit(s)/Hr (18 mL/Hr) IV Continuous <Continuous>  insulin regular  human corrective regimen sliding scale   SubCutaneous every 6 hours  iron sucrose IVPB 200 milliGRAM(s) IV Intermittent once  norepinephrine Infusion 0.05 MICROgram(s)/kG/Min (10.2 mL/Hr) IV Continuous <Continuous>  pantoprazole  Injectable 40 milliGRAM(s) IV Push every 24 hours  propofol Infusion 5 MICROgram(s)/kG/Min (3.27 mL/Hr) IV Continuous <Continuous>    MEDICATIONS  (PRN):      ALLERGIES:  Allergies    No Known Allergies    Intolerances        LABS:                        9.9    10.57 )-----------( 109      ( 2021 06:19 )             31.1     04-21    134<L>  |  94<L>  |  49<H>  ----------------------------<  155<H>  4.2   |  23  |  6.66<H>    Ca    7.9<L>      2021 06:19  Phos  6.4     04-21  Mg     1.9     -21    TPro  6.7  /  Alb  3.6  /  TBili  0.3  /  DBili  x   /  AST  19  /  ALT  14  /  AlkPhos  80  04-21    PT/INR - ( 2021 06:19 )   PT: 14.3 sec;   INR: 1.20          PTT - ( 2021 06:19 )  PTT:59.7 sec  Urinalysis Basic - ( 2021 01:45 )    Color: Yellow / Appearance: Clear / S.025 / pH: x  Gluc: x / Ketone: NEGATIVE  / Bili: Negative / Urobili: 0.2 E.U./dL   Blood: x / Protein: 100 mg/dL / Nitrite: NEGATIVE   Leuk Esterase: NEGATIVE / RBC: < 5 /HPF / WBC < 5 /HPF   Sq Epi: x / Non Sq Epi: 0-5 /HPF / Bacteria: Present /HPF      CAPILLARY BLOOD GLUCOSE      POCT Blood Glucose.: 150 mg/dL (2021 05:32)      RADIOLOGY & ADDITIONAL TESTS: Reviewed.    PLAN:

## 2021-04-21 NOTE — CHART NOTE - NSCHARTNOTESELECT_GEN_ALL_CORE
Event Note
Event Note
bedside echo/Event Note
Event Note
Infectious diseases brief note/Event Note
Infectious diseases brief note/Event Note

## 2021-04-21 NOTE — PROGRESS NOTE ADULT - ASSESSMENT
50 yo M w/ ESRD (HD MWF), CAD (s/p CABGx4, stents), HTN who p/w hypertensive emergency with fluid overload s/p intubation for hypoxic respiratory failure after missing dialysis yesterday. Renal consulted.    ESRD on HD MWF, centre unknown  IHD today for volume optimisation  Lytes, bicarb per HD  BP: UF with HD, coreg 3.25q12h,   Anaemia- IV iron with HD, no epo given MI w/u  BMD: defer phos binder, renal diet    Dialyzer: Optiflux [id number]         QB: 400 mL/min         QD: 500 mL/min      K bath: 3  Goal UF: 3L                Duration: 180 min     Daily weights, strict I&Os, renally dose meds

## 2021-04-21 NOTE — PROGRESS NOTE ADULT - SUBJECTIVE AND OBJECTIVE BOX
Patient is a 51y Male seen and evaluated at bedside. Hypertensive while on propofol- likely hypervolaemic state. CXR improved from yesterday. IHD today with 3k bath, goal UF 3L. Cardiac w/u per CCU.    Meds:    aspirin  chewable 81 daily  atorvastatin 80 every 24 hours  carvedilol 6.25 every 12 hours  cefTRIAXone   IVPB 2000 every 24 hours  chlorhexidine 0.12% Liquid 15 every 12 hours  chlorhexidine 2% Cloths 1 <User Schedule>  clopidogrel Tablet 75 every 24 hours  dextrose 40% Gel 15 once  dextrose 5%. 1000 <Continuous>  dextrose 5%. 1000 <Continuous>  dextrose 50% Injectable 25 once  dextrose 50% Injectable 12.5 once  dextrose 50% Injectable 25 once  glucagon  Injectable 1 once  heparin  Infusion 1800 <Continuous>  insulin regular  human corrective regimen sliding scale  every 6 hours  iron sucrose IVPB 200 once  pantoprazole  Injectable 40 every 24 hours  propofol Infusion 5 <Continuous>      T(C): , Max: 38.2 (21 @ 09:49)  T(F): , Max: 100.7 (21 @ 09:49)  HR: 91 (21 @ 12:18)  BP: 145/66 (21 @ 21:00)  BP(mean): 95 (21 @ 21:00)  RR: 20 (21 @ 12:18)  SpO2: 97% (21 @ 12:18)  Wt(kg): --     @ 07:  -   @ 07:00  --------------------------------------------------------  IN: 1607.2 mL / OUT: 4845 mL / NET: -3237.8 mL     @ 07:01  -   @ 12:20  --------------------------------------------------------  IN: 235 mL / OUT: 0 mL / NET: 235 mL      Height (cm): 190.5 ( @ 08:00)  Weight (kg): 120.8 ( @ 08:00)  BMI (kg/m2): 33.3 ( @ 08:00)  BSA (m2): 2.48 ( @ 08:00)    Review of Systems: Unable to participate    PHYSICAL EXAM:  GENERAL: Intubated, sedated on FiO2 35%  CHEST/LUNG: Bilateral coarse rales  HEART: Regular rate and rhythm, no murmur, s/p sternotomy, Right subQ chest wall device  ABDOMEN: Soft, nontender, non distended, s/p surgical incision  EXTREMITIES: 1+ pitting pedal oedema, Left great toe amputation  ACCESS: RUE AVF w/bruit and thrill     LABS:                        9.9    10.57 )-----------( 109      ( 2021 06:19 )             31.1     -    134<L>  |  94<L>  |  49<H>  ----------------------------<  155<H>  4.2   |  23  |  6.66<H>    Ca    7.9<L>      2021 06:19  Phos  6.4     -  Mg     1.9     -    TPro  6.7  /  Alb  3.6  /  TBili  0.3  /  DBili  x   /  AST  19  /  ALT  14  /  AlkPhos  80  04-21      PT/INR - ( 2021 06:19 )   PT: 14.3 sec;   INR: 1.20          PTT - ( 2021 09:10 )  PTT:75.9 sec  Urinalysis Basic - ( 2021 01:45 )    Color: Yellow / Appearance: Clear / S.025 / pH: x  Gluc: x / Ketone: NEGATIVE  / Bili: Negative / Urobili: 0.2 E.U./dL   Blood: x / Protein: 100 mg/dL / Nitrite: NEGATIVE   Leuk Esterase: NEGATIVE / RBC: < 5 /HPF / WBC < 5 /HPF   Sq Epi: x / Non Sq Epi: 0-5 /HPF / Bacteria: Present /HPF            RADIOLOGY & ADDITIONAL STUDIES:

## 2021-04-22 LAB
ALBUMIN SERPL ELPH-MCNC: 3.6 G/DL — SIGNIFICANT CHANGE UP (ref 3.3–5)
ALP SERPL-CCNC: 84 U/L — SIGNIFICANT CHANGE UP (ref 40–120)
ALT FLD-CCNC: 11 U/L — SIGNIFICANT CHANGE UP (ref 10–45)
ANION GAP SERPL CALC-SCNC: 19 MMOL/L — HIGH (ref 5–17)
APTT BLD: 79.5 SEC — HIGH (ref 27.5–35.5)
AST SERPL-CCNC: 16 U/L — SIGNIFICANT CHANGE UP (ref 10–40)
BASE EXCESS BLDA CALC-SCNC: 1 MMOL/L — SIGNIFICANT CHANGE UP (ref -2–3)
BILIRUB SERPL-MCNC: 0.4 MG/DL — SIGNIFICANT CHANGE UP (ref 0.2–1.2)
BUN SERPL-MCNC: 44 MG/DL — HIGH (ref 7–23)
CALCIUM SERPL-MCNC: 8.4 MG/DL — SIGNIFICANT CHANGE UP (ref 8.4–10.5)
CHLORIDE SERPL-SCNC: 92 MMOL/L — LOW (ref 96–108)
CHOLEST SERPL-MCNC: 122 MG/DL — SIGNIFICANT CHANGE UP
CK MB CFR SERPL CALC: 5.8 NG/ML — SIGNIFICANT CHANGE UP (ref 0–6.7)
CK SERPL-CCNC: 553 U/L — HIGH (ref 30–200)
CO2 SERPL-SCNC: 23 MMOL/L — SIGNIFICANT CHANGE UP (ref 22–31)
CREAT SERPL-MCNC: 6.24 MG/DL — HIGH (ref 0.5–1.3)
GLUCOSE BLDC GLUCOMTR-MCNC: 150 MG/DL — HIGH (ref 70–99)
GLUCOSE BLDC GLUCOMTR-MCNC: 180 MG/DL — HIGH (ref 70–99)
GLUCOSE BLDC GLUCOMTR-MCNC: 182 MG/DL — HIGH (ref 70–99)
GLUCOSE BLDC GLUCOMTR-MCNC: 208 MG/DL — HIGH (ref 70–99)
GLUCOSE BLDC GLUCOMTR-MCNC: 211 MG/DL — HIGH (ref 70–99)
GLUCOSE SERPL-MCNC: 189 MG/DL — HIGH (ref 70–99)
HCO3 BLDA-SCNC: 25 MMOL/L — SIGNIFICANT CHANGE UP (ref 21–28)
HCT VFR BLD CALC: 33.9 % — LOW (ref 39–50)
HDLC SERPL-MCNC: 39 MG/DL — LOW
HGB BLD-MCNC: 10.7 G/DL — LOW (ref 13–17)
INR BLD: 1.25 — HIGH (ref 0.88–1.16)
LIPID PNL WITH DIRECT LDL SERPL: 32 MG/DL — SIGNIFICANT CHANGE UP
MAGNESIUM SERPL-MCNC: 2 MG/DL — SIGNIFICANT CHANGE UP (ref 1.6–2.6)
MCHC RBC-ENTMCNC: 27.2 PG — SIGNIFICANT CHANGE UP (ref 27–34)
MCHC RBC-ENTMCNC: 31.6 GM/DL — LOW (ref 32–36)
MCV RBC AUTO: 86.3 FL — SIGNIFICANT CHANGE UP (ref 80–100)
NON HDL CHOLESTEROL: 83 MG/DL — SIGNIFICANT CHANGE UP
NRBC # BLD: 0 /100 WBCS — SIGNIFICANT CHANGE UP (ref 0–0)
PCO2 BLDA: 36 MMHG — SIGNIFICANT CHANGE UP (ref 35–48)
PH BLDA: 7.45 — SIGNIFICANT CHANGE UP (ref 7.35–7.45)
PHOSPHATE SERPL-MCNC: 7.3 MG/DL — HIGH (ref 2.5–4.5)
PLATELET # BLD AUTO: 135 K/UL — LOW (ref 150–400)
PO2 BLDA: 116 MMHG — HIGH (ref 83–108)
POTASSIUM SERPL-MCNC: 4.1 MMOL/L — SIGNIFICANT CHANGE UP (ref 3.5–5.3)
POTASSIUM SERPL-SCNC: 4.1 MMOL/L — SIGNIFICANT CHANGE UP (ref 3.5–5.3)
PROT SERPL-MCNC: 7.4 G/DL — SIGNIFICANT CHANGE UP (ref 6–8.3)
PROTHROM AB SERPL-ACNC: 14.8 SEC — HIGH (ref 10.6–13.6)
RBC # BLD: 3.93 M/UL — LOW (ref 4.2–5.8)
RBC # FLD: 14.9 % — HIGH (ref 10.3–14.5)
SAO2 % BLDA: 98 % — SIGNIFICANT CHANGE UP (ref 95–100)
SODIUM SERPL-SCNC: 134 MMOL/L — LOW (ref 135–145)
TRIGL SERPL-MCNC: 255 MG/DL — HIGH
TROPONIN T SERPL-MCNC: 1.47 NG/ML — CRITICAL HIGH (ref 0–0.01)
WBC # BLD: 9.36 K/UL — SIGNIFICANT CHANGE UP (ref 3.8–10.5)
WBC # FLD AUTO: 9.36 K/UL — SIGNIFICANT CHANGE UP (ref 3.8–10.5)

## 2021-04-22 PROCEDURE — 93306 TTE W/DOPPLER COMPLETE: CPT | Mod: 26

## 2021-04-22 PROCEDURE — 99291 CRITICAL CARE FIRST HOUR: CPT

## 2021-04-22 PROCEDURE — 99233 SBSQ HOSP IP/OBS HIGH 50: CPT

## 2021-04-22 PROCEDURE — 71045 X-RAY EXAM CHEST 1 VIEW: CPT | Mod: 26,59

## 2021-04-22 PROCEDURE — 99222 1ST HOSP IP/OBS MODERATE 55: CPT

## 2021-04-22 PROCEDURE — 99292 CRITICAL CARE ADDL 30 MIN: CPT

## 2021-04-22 RX ORDER — HYDRALAZINE HCL 50 MG
25 TABLET ORAL THREE TIMES A DAY
Refills: 0 | Status: DISCONTINUED | OUTPATIENT
Start: 2021-04-22 | End: 2021-04-23

## 2021-04-22 RX ORDER — PANTOPRAZOLE SODIUM 20 MG/1
40 TABLET, DELAYED RELEASE ORAL
Refills: 0 | Status: DISCONTINUED | OUTPATIENT
Start: 2021-04-22 | End: 2021-04-23

## 2021-04-22 RX ORDER — PRASUGREL 5 MG/1
60 TABLET, FILM COATED ORAL ONCE
Refills: 0 | Status: COMPLETED | OUTPATIENT
Start: 2021-04-22 | End: 2021-04-22

## 2021-04-22 RX ORDER — PRASUGREL 5 MG/1
10 TABLET, FILM COATED ORAL DAILY
Refills: 0 | Status: DISCONTINUED | OUTPATIENT
Start: 2021-04-23 | End: 2021-04-23

## 2021-04-22 RX ORDER — SEVELAMER CARBONATE 2400 MG/1
800 POWDER, FOR SUSPENSION ORAL
Refills: 0 | Status: DISCONTINUED | OUTPATIENT
Start: 2021-04-22 | End: 2021-04-23

## 2021-04-22 RX ORDER — DEXMEDETOMIDINE HYDROCHLORIDE IN 0.9% SODIUM CHLORIDE 4 UG/ML
0.1 INJECTION INTRAVENOUS
Qty: 400 | Refills: 0 | Status: DISCONTINUED | OUTPATIENT
Start: 2021-04-22 | End: 2021-04-22

## 2021-04-22 RX ORDER — INSULIN LISPRO 100/ML
5 VIAL (ML) SUBCUTANEOUS
Refills: 0 | Status: DISCONTINUED | OUTPATIENT
Start: 2021-04-22 | End: 2021-04-23

## 2021-04-22 RX ORDER — ACETAMINOPHEN 500 MG
650 TABLET ORAL ONCE
Refills: 0 | Status: COMPLETED | OUTPATIENT
Start: 2021-04-22 | End: 2021-04-22

## 2021-04-22 RX ORDER — HEPARIN SODIUM 5000 [USP'U]/ML
5000 INJECTION INTRAVENOUS; SUBCUTANEOUS EVERY 8 HOURS
Refills: 0 | Status: DISCONTINUED | OUTPATIENT
Start: 2021-04-22 | End: 2021-04-23

## 2021-04-22 RX ORDER — PIPERACILLIN AND TAZOBACTAM 4; .5 G/20ML; G/20ML
4.5 INJECTION, POWDER, LYOPHILIZED, FOR SOLUTION INTRAVENOUS EVERY 12 HOURS
Refills: 0 | Status: DISCONTINUED | OUTPATIENT
Start: 2021-04-22 | End: 2021-04-22

## 2021-04-22 RX ORDER — INSULIN GLARGINE 100 [IU]/ML
25 INJECTION, SOLUTION SUBCUTANEOUS AT BEDTIME
Refills: 0 | Status: DISCONTINUED | OUTPATIENT
Start: 2021-04-22 | End: 2021-04-23

## 2021-04-22 RX ADMIN — PROPOFOL 3.27 MICROGRAM(S)/KG/MIN: 10 INJECTION, EMULSION INTRAVENOUS at 04:44

## 2021-04-22 RX ADMIN — Medication 650 MILLIGRAM(S): at 19:12

## 2021-04-22 RX ADMIN — INSULIN HUMAN 4: 100 INJECTION, SOLUTION SUBCUTANEOUS at 12:40

## 2021-04-22 RX ADMIN — CARVEDILOL PHOSPHATE 6.25 MILLIGRAM(S): 80 CAPSULE, EXTENDED RELEASE ORAL at 23:19

## 2021-04-22 RX ADMIN — CHLORHEXIDINE GLUCONATE 1 APPLICATION(S): 213 SOLUTION TOPICAL at 05:16

## 2021-04-22 RX ADMIN — Medication 5 UNIT(S): at 17:37

## 2021-04-22 RX ADMIN — Medication 650 MILLIGRAM(S): at 08:47

## 2021-04-22 RX ADMIN — Medication 81 MILLIGRAM(S): at 11:23

## 2021-04-22 RX ADMIN — Medication 25 MILLIGRAM(S): at 17:37

## 2021-04-22 RX ADMIN — HEPARIN SODIUM 5000 UNIT(S): 5000 INJECTION INTRAVENOUS; SUBCUTANEOUS at 15:21

## 2021-04-22 RX ADMIN — PANTOPRAZOLE SODIUM 40 MILLIGRAM(S): 20 TABLET, DELAYED RELEASE ORAL at 11:23

## 2021-04-22 RX ADMIN — HEPARIN SODIUM 18 UNIT(S)/HR: 5000 INJECTION INTRAVENOUS; SUBCUTANEOUS at 09:11

## 2021-04-22 RX ADMIN — PANTOPRAZOLE SODIUM 40 MILLIGRAM(S): 20 TABLET, DELAYED RELEASE ORAL at 05:35

## 2021-04-22 RX ADMIN — ATORVASTATIN CALCIUM 80 MILLIGRAM(S): 80 TABLET, FILM COATED ORAL at 11:24

## 2021-04-22 RX ADMIN — Medication 650 MILLIGRAM(S): at 09:46

## 2021-04-22 RX ADMIN — PRASUGREL 60 MILLIGRAM(S): 5 TABLET, FILM COATED ORAL at 13:20

## 2021-04-22 RX ADMIN — INSULIN HUMAN 2: 100 INJECTION, SOLUTION SUBCUTANEOUS at 05:16

## 2021-04-22 RX ADMIN — CHLORHEXIDINE GLUCONATE 15 MILLILITER(S): 213 SOLUTION TOPICAL at 05:17

## 2021-04-22 RX ADMIN — INSULIN GLARGINE 25 UNIT(S): 100 INJECTION, SOLUTION SUBCUTANEOUS at 22:27

## 2021-04-22 RX ADMIN — Medication 650 MILLIGRAM(S): at 20:00

## 2021-04-22 RX ADMIN — CARVEDILOL PHOSPHATE 6.25 MILLIGRAM(S): 80 CAPSULE, EXTENDED RELEASE ORAL at 11:24

## 2021-04-22 RX ADMIN — HEPARIN SODIUM 5000 UNIT(S): 5000 INJECTION INTRAVENOUS; SUBCUTANEOUS at 22:26

## 2021-04-22 RX ADMIN — PROPOFOL 3.27 MICROGRAM(S)/KG/MIN: 10 INJECTION, EMULSION INTRAVENOUS at 00:25

## 2021-04-22 RX ADMIN — SEVELAMER CARBONATE 800 MILLIGRAM(S): 2400 POWDER, FOR SUSPENSION ORAL at 17:37

## 2021-04-22 RX ADMIN — INSULIN HUMAN 2: 100 INJECTION, SOLUTION SUBCUTANEOUS at 17:37

## 2021-04-22 NOTE — PROGRESS NOTE ADULT - SUBJECTIVE AND OBJECTIVE BOX
Patient is a 51y Male seen and evaluated at bedside. S/p extubation. Volume, lytes acceptable. Defer HD today. Start renvela 800 TID for hyperphosphataemia.    Meds:    aspirin  chewable 81 daily  atorvastatin 80 every 24 hours  carvedilol 6.25 every 12 hours  chlorhexidine 2% Cloths 1 <User Schedule>  dextrose 40% Gel 15 once  dextrose 5%. 1000 <Continuous>  dextrose 5%. 1000 <Continuous>  dextrose 50% Injectable 25 once  dextrose 50% Injectable 12.5 once  dextrose 50% Injectable 25 once  glucagon  Injectable 1 once  insulin regular  human corrective regimen sliding scale  every 6 hours  pantoprazole    Tablet 40 before breakfast  prasugrel 60 once      T(C): , Max: 38.6 (04-22-21 @ 08:30)  T(F): , Max: 101.5 (04-22-21 @ 08:30)  HR: 84 (04-22-21 @ 12:00)  BP: 132/64 (04-22-21 @ 08:30)  BP(mean): 92 (04-22-21 @ 08:30)  RR: 19 (04-22-21 @ 12:00)  SpO2: 99% (04-22-21 @ 12:00)  Wt(kg): --    04-21 @ 07:01  -  04-22 @ 07:00  --------------------------------------------------------  IN: 1133.3 mL / OUT: 4500 mL / NET: -3366.7 mL    04-22 @ 07:01  -  04-22 @ 13:16  --------------------------------------------------------  IN: 74 mL / OUT: 500 mL / NET: -426 mL    Review of Systems:  CONSTITUTIONAL: +hoarse voice, throat pain  RESPIRATORY: No shortness of breath  CARDIOVASCULAR: No chest pain   MUSCULOSKELETAL: No leg oedema    PHYSICAL EXAM:  GENERAL: well-developed, well nourished, alert, no acute distress at present on non rebreather  CHEST/LUNG: Clear to auscultation bilaterally  HEART: Regular rate and rhythm, no murmur, s/p sternotomy, Right subQ chest wall device  ABDOMEN: Soft, nontender, non distended, s/p surgical incision  EXTREMITIES: no oedema, Left great toe amputation  ACCESS: RUE AVF w/bruit and thrill     LABS:                        10.7   9.36  )-----------( 135      ( 22 Apr 2021 05:10 )             33.9     04-22    134<L>  |  92<L>  |  44<H>  ----------------------------<  189<H>  4.1   |  23  |  6.24<H>    Ca    8.4      22 Apr 2021 05:10  Phos  7.3     04-22  Mg     2.0     04-22    TPro  7.4  /  Alb  3.6  /  TBili  0.4  /  DBili  x   /  AST  16  /  ALT  11  /  AlkPhos  84  04-22    Cholesterol, Serum: 122 mg/dL (04-22 @ 05:10)    PT/INR - ( 22 Apr 2021 05:10 )   PT: 14.8 sec;   INR: 1.25          PTT - ( 22 Apr 2021 05:10 )  PTT:79.5 sec          RADIOLOGY & ADDITIONAL STUDIES:

## 2021-04-22 NOTE — PROGRESS NOTE ADULT - ASSESSMENT
52 yo M w/ ESRD (HD MWF), CAD (s/p CABGx4, stents), HTN, pacemaker who p/w SOB i/s/o missed dialysis, admitted for urgent HD and management of respiratory failure s/p intubation 2/2 to acute of chronic decompensated heart failure, c/b by NSTEMI    NEURO  #Sedation  - propofol gtt   - trend TGs every other other day       CARDIOVASCULAR  #Troponemia/ NSTEMI  - Cardiac enzyme uptrending to 1.12 trop from 0.3, today improved   - On admission EKG w/ LBBB, ST depressions in I, II, III, aVF, V5, V6, Today ECG ST depression and TWI in I, II, AVL, V3, V4,V5,V6.  - trend trops  - c/w ASA 81, Plavix 75      #CAD  - s/p CABG, stents x [id number]  - See chart note from collateral     #HTN  - home meds amlodipine, metoprolol, prn hydralazine after HD  - s/p hydralazine 10mg IVP, labetalol 20mg IVP, nitroglycerin 0.4mg sublingual x2 in ED 2/2 hypertensive urgency on admission  -Coreg 6.25 BID       #R/o Heart failure  - bedside echo s/f reduced LVEF (approximately 40%), moderate AS w/ bicuspid valve  - f/u official echo after transfer from COVID unit to CCU   - HbA1c, 7.9  - Monitor Lipid panel every other day   - consider pacemaker interrogation      PULM  #Acute hypoxic and hypercapnic respiratory failure  - Intubated in ED due to respiratory failure   -likely 2/2 fluid overload i/s/o missed HD 4/19; CXR s/f pleural effusions vs CHF exacerbation   - S/p  emergent HD 4/20 AM, s/p removal of 4L; repeat CXR 4/20 after dialysis still p. Effuion  -Plan for HD on 4/21  -Plan for extubation tomorrow if p.effusion improved after HD     RENAL  #ESRD  - HD M/W/F, missed Monday 4/19 session p/w SOB likely 2/2 volume overload -> s/p Lasix 80mg IVP in ED  - BUN/Cr 68/7.7 on admission   - renal consulted; recs appreciated  - HD 4/20 removed 4 L and will get HD 4/21 one more session      #Volume status  - CXR w/ pleural effusion i/s/o of missed HD, fluid overload  - strict I&Os  - get repeat CXR post HD    #Mixed Metabolic & Respiratory Acidosis   - On arrival pH 7.21, pCO2 51, HCO3 20, Lactate 2.6  - On 4/20 emergent HD, patient intubated  - initially w/ elevated anion gap, repeat labs: resolved; lactate wnl.  - continue to monitor, f/u repeat ABG daily     #Electrolyte abnormalities  - Ca 8.1 on admission; ionized Ca 1.03 (L)  - likely i/s/o ESRD, hyperphosphatemia (5.2)  - Hyperkalemia improved   - s/p em,ergent HD ending 4/20  w/ 4L removed  - HD today     ENDOCRINE  #Glucose control  -DMT2 on Home insulin   - currently on low dose sliding scale q6h  - HbA1c 7.9  - monitor FSG daily       HEME  #Anemia  - Hb 12.5 on admission, MCV 88.8  - no signs of active bleeding  - most likely 2/2 ESRD    #Leukocytosis  - WBC 20 (80% neutrophils) on admission, repeat CBC w/ WBC of 24  - likely reactive, however cannot r/o infection entirely (mgmt for sepsis see below)  - trend    ID  #Severe Sepsis  - met severe sepsis criteria for , RR 38, WBC 20, Lactate 2.6 on admission  - Procalcitonin 0.20  - COVID neg x2, COVID ab +, COVID Neucleocapsid negative, per pt's sister, pt got vaccine 2 weeks ago , Per ID clear to transfer to non COVID unit   - UA negative for UTI; no clear source  - would s/t ceftriaxone and keep on for 48 hr while awaiting bcx results  - f/u blood cultures    Fluids: No IVF currently needed  Electrolytes: Replete w/ caution (ESRD)  Nutrition: NPO except meds  Prophylaxis: Heparin gtt  GI: Protonix 40mg IVP q24hrs  C: FC  Dispo: CCU   50 yo M w/ ESRD (HD MWF), CAD (s/p CABGx4, stents), HTN, pacemaker who p/w SOB i/s/o missed dialysis, admitted for urgent HD and management of respiratory failure s/p intubation 2/2 to acute of chronic decompensated heart failure, c/b by NSTEMI.     NEURO  #Sedation  - propofol held, on   - trend TGs every other other day       CARDIOVASCULAR  #Troponemia/ NSTEMI  - Cardiac enzyme uptrending to 1.12 trop from 0.3, today improved   - On admission EKG w/ LBBB, ST depressions in I, II, III, aVF, V5, V6, Today ECG ST depression and TWI in I, II, AVL, V3, V4,V5,V6.  - trend trops  - c/w ASA 81, Plavix 75      #CAD  - s/p CABG, stents x [id number]  - See chart note from collateral     #HTN  - home meds amlodipine, metoprolol, prn hydralazine after HD  - s/p hydralazine 10mg IVP, labetalol 20mg IVP, nitroglycerin 0.4mg sublingual x2 in ED 2/2 hypertensive urgency on admission  -Coreg 6.25 BID       #R/o Heart failure  - bedside echo s/f reduced LVEF (approximately 40%), moderate AS w/ bicuspid valve  - f/u official echo after transfer from COVID unit to CCU   - HbA1c, 7.9  - Monitor Lipid panel every other day   - consider pacemaker interrogation      PULM  #Acute hypoxic and hypercapnic respiratory failure  - Intubated in ED due to respiratory failure   -likely 2/2 fluid overload i/s/o missed HD 4/19; CXR s/f pleural effusions vs CHF exacerbation   - S/p  emergent HD 4/20 AM, s/p removal of 4L; repeat CXR 4/20 after dialysis still p. Effuion  -Plan for HD on 4/21  -Plan for extubation tomorrow if p.effusion improved after HD     RENAL  #ESRD  - HD M/W/F, missed Monday 4/19 session p/w SOB likely 2/2 volume overload -> s/p Lasix 80mg IVP in ED  - BUN/Cr 68/7.7 on admission   - renal consulted; recs appreciated  - HD 4/20 removed 4 L and will get HD 4/21 one more session      #Volume status  - CXR w/ pleural effusion i/s/o of missed HD, fluid overload  - strict I&Os  - get repeat CXR post HD    #Mixed Metabolic & Respiratory Acidosis   - On arrival pH 7.21, pCO2 51, HCO3 20, Lactate 2.6  - On 4/20 emergent HD, patient intubated  - initially w/ elevated anion gap, repeat labs: resolved; lactate wnl.  - continue to monitor, f/u repeat ABG daily     #Electrolyte abnormalities  - Ca 8.1 on admission; ionized Ca 1.03 (L)  - likely i/s/o ESRD, hyperphosphatemia (5.2)  - Hyperkalemia improved   - s/p em,ergent HD ending 4/20  w/ 4L removed  - HD today     ENDOCRINE  #Glucose control  -DMT2 on Home insulin   - currently on low dose sliding scale q6h  - HbA1c 7.9  - monitor FSG daily       HEME  #Anemia  - Hb 12.5 on admission, MCV 88.8  - no signs of active bleeding  - most likely 2/2 ESRD    #Leukocytosis  - WBC 20 (80% neutrophils) on admission, repeat CBC w/ WBC of 24  - likely reactive, however cannot r/o infection entirely (mgmt for sepsis see below)  - trend    ID  #Severe Sepsis  - met severe sepsis criteria for , RR 38, WBC 20, Lactate 2.6 on admission  - Procalcitonin 0.20  - COVID neg x2, COVID ab +, COVID Neucleocapsid negative, per pt's sister, pt got vaccine 2 weeks ago , Per ID clear to transfer to non COVID unit   - UA negative for UTI; no clear source  - would s/t ceftriaxone and keep on for 48 hr while awaiting bcx results  - f/u blood cultures    Fluids: No IVF currently needed  Electrolytes: Replete w/ caution (ESRD)  Nutrition: NPO except meds  Prophylaxis: Heparin gtt  GI: Protonix 40mg IVP q24hrs  C: FC  Dispo: CCU   52 yo M w/ ESRD (HD MWF), CAD (s/p CABGx4, stents), HTN, pacemaker who p/w SOB i/s/o missed dialysis, admitted for urgent HD and management of respiratory failure s/p intubation 2/2 to acute of chronic decompensated heart failure, c/b by NSTEMI.       NEURO  #Sedation  - propofol held this AM  - c/w Precedex  -TG today 255      CARDIOVASCULAR  # NSTEMI  - Cardiac enzyme plateau  - On admission EKG w/ LBBB, ST depressions in I, II, III, aVF, V5, V6, Today ECG Intraventricular conduction delay   - c/w ASA 81  -Switched Plavix 75 to home med Effient 60mg and tomorrow 10mg daily  -Hep gtt dc'd    #CAD  - s/p CABG, stents x [id number]  - c/w ASA 81  - Loaded Effient 60mg  -c/w Effient 10mg daily start 4/23    #HTN  - home meds amlodipine, metoprolol, prn hydralazine after HD  -c/w Coreg 6.25 BID       #R/o Heart failure  -TTE 4/22: Basal and mid inferior septum and basal and mid inferior wall are abnormal. Moderately reduced left ventricular systolic function with and ejection fraction of 35-40%. Regional wall motion abnormalities consistent with ischemic heart disease. Normal right ventricular size and systolic function. Normal atria. Moderate aortic stenosis. No pericardial effusion.    PULM  #Acute hypoxic and hypercapnic respiratory failure  - Intubated on 4/20 ane extubated 4/22  -likely 2/2 fluid overload i/s/o missed HD 4/19; CXR s/f pleural effusions vs Acute on chronic CHF exacerbation   - S/p  emergent HD 4/20 removal 4L   -Plan for HD on 4/21 removal 4L  -CXR plural effusion improved after 2x HD   -Plan for HD tomorrow     RENAL  #ESRD  - HD M/W/F, missed HD before coming to the [facility]   - BUN/Cr 68/7.7 on admission   - renal consulted; recs appreciated  - HD 4/20 removed 4 L and will get HD 4/21 one more session      #Volume status  - CXR w/ pleural effusion i/s/o of missed HD, fluid overload  - strict I&Os  - get repeat CXR post HD    #Mixed Metabolic & Respiratory Acidosis   - On arrival pH 7.21, pCO2 51, HCO3 20, Lactate 2.6  - On 4/20 emergent HD, patient intubated  - initially w/ elevated anion gap, repeat labs: resolved; lactate wnl.  - continue to monitor, f/u repeat ABG daily     #Electrolyte abnormalities  -Improved     ENDOCRINE  #Glucose control  -DMT2 on Home insulin   - currently on low dose sliding scale q6h  - HbA1c 7.9  - monitor FSG daily       HEME  #Anemia  - Hb 12.5 on admission, MCV 88.8  - no signs of active bleeding  - most likely 2/2 ESRD    #Leukocytosis  -Improved     ID  #Severe Sepsis  - Improved  - Procalcitonin 0.20  - COVID neg x2, COVID ab +, COVID Neucleocapsid negative, per pt's sister, pt got vaccine 2 weeks ago , Per ID clear to transfer to non COVID unit   - UA negative for UTI; no clear source  - Ceftriaxone  held  - New Tmax 101.5 rectal, No source of urinary, lung, blood, wound  infection. AV fistula clean, nontender non infected  - ID consulted for new Fever       Fluids: No IVF currently needed  Electrolytes: Replete w/ caution (ESRD)  Nutrition: NPO except meds, NGT, Diet pending S& S    Prophylaxis: Heparin gtt held, heparin SQ for PPx   GI: Protonix 40mg PO   Dispo: CCU       50 yo M w/ ESRD (HD MWF), CAD (s/p CABGx4, stents), HTN, pacemaker who p/w SOB i/s/o missed dialysis, admitted for urgent HD and management of respiratory failure s/p intubation 2/2 to acute of chronic decompensated heart failure, c/b by NSTEMI.       NEURO  #Sedation  - propofol held this AM  - c/w Precedex  -TG today 255      CARDIOVASCULAR  # NSTEMI  - Cardiac enzyme plateau  - On admission EKG w/ LBBB, ST depressions in I, II, III, aVF, V5, V6, Today ECG Intraventricular conduction delay   - c/w ASA 81  -Switched Plavix 75 to home med Effient 60mg and tomorrow 10mg daily  -Hep gtt dc'd    #CAD  - s/p CABG, stents x [id number]  - c/w ASA 81  - Loaded Effient 60mg  -c/w Effient 10mg daily start 4/23    #HTN  - home meds amlodipine, metoprolol, prn hydralazine after HD  -c/w Coreg 6.25 BID       #R/o Heart failure  -TTE 4/22: Basal and mid inferior septum and basal and mid inferior wall are abnormal. Moderately reduced left ventricular systolic function with and ejection fraction of 35-40%. Regional wall motion abnormalities consistent with ischemic heart disease. Normal right ventricular size and systolic function. Normal atria. Moderate aortic stenosis. No pericardial effusion.    PULM  #Acute hypoxic and hypercapnic respiratory failure  - Intubated on 4/20 ane extubated 4/22  -likely 2/2 fluid overload i/s/o missed HD 4/19; CXR s/f pleural effusions vs Acute on chronic CHF exacerbation   - S/p  emergent HD 4/20 removal 4L   -Plan for HD on 4/21 removal 4L  -CXR plural effusion improved after 2x HD   -Plan for HD tomorrow     RENAL  #ESRD  - HD M/W/F, missed HD before coming [facility] the [facility]   - BUN/Cr 68/7.7 on admission   - renal consulted; recs appreciated  - HD 4/20 removed 4 L and will get HD 4/21 one more session      #Volume status  - CXR w/ pleural effusion i/s/o of missed HD, fluid overload  - strict I&Os  - get repeat CXR post HD    #Mixed Metabolic & Respiratory Acidosis   Improved     #Electrolyte abnormalities  -Improved     ENDOCRINE  #Glucose control  -DMT2 on Home insulin   - currently on low dose sliding scale q6h  - HbA1c 7.9  - monitor FSG daily       HEME  #Anemia  - Hb 12.5 on admission, MCV 88.8  - no signs of active bleeding  - most likely 2/2 ESRD    #Leukocytosis  -Improved     ID  #Severe Sepsis  - Improved  - Procalcitonin 0.20  - COVID neg x2, COVID ab +, COVID Neucleocapsid negative, per pt's sister, pt got vaccine 2 weeks ago , Per ID clear to transfer to non COVID unit   - UA negative for UTI; no clear source  - Ceftriaxone  held  - New Tmax 101.5 rectal, No source of urinary, lung, blood, wound  infection. AV fistula clean, nontender non infected  - ID consulted for new Fever       Fluids: No IVF currently needed  Electrolytes: Replete w/ caution (ESRD)  Nutrition: NPO except meds, NGT, Diet pending S& S    Prophylaxis: Heparin gtt held, heparin SQ for PPx   GI: Protonix 40mg PO   Dispo: CCU

## 2021-04-22 NOTE — PROGRESS NOTE ADULT - ASSESSMENT
50 yo M w/ ESRD (HD MWF), CAD (s/p CABGx4, stents), HTN who p/w hypertensive emergency with fluid overload s/p intubation for hypoxic respiratory failure after missing dialysis yesterday. Renal consulted.    ESRD on HD MWF, centre unknown  Defer IHD today- volume, lytes, bicarb acceptable  BP: UF with HD, coreg 3.25q12h, controlled  Anaemia- IV iron with HD, no epo given MI w/u  BMD: start renvela 800 TID    Daily weights, strict I&Os, renally dose meds

## 2021-04-22 NOTE — PROGRESS NOTE ADULT - SUBJECTIVE AND OBJECTIVE BOX
OVERNIGHT EVENTS: Started on coreg 6.25. Started briefly on fentanyl and increased fentanyl as he was waking up.  Decreased sedation in AM. Fentanyl stopped. Propofol decreased. Followed commands but was mildly agitated. Added precedex. Now on Precedex 0.3 and Propofol 20. On CPAP tolerating it well.     SUBJECTIVE / INTERVAL HPI: Patient seen and examined at bedside.     VITAL SIGNS:  Vital Signs Last 24 Hrs  T(C): 37.1 (22 Apr 2021 02:10), Max: 38.2 (21 Apr 2021 09:49)  T(F): 98.7 (22 Apr 2021 02:10), Max: 100.7 (21 Apr 2021 09:49)  HR: 91 (22 Apr 2021 06:12) (84 - 104)  BP: --  BP(mean): --  RR: 21 (22 Apr 2021 06:12) (10 - 31)  SpO2: 96% (22 Apr 2021 06:12) (93% - 100%)    PHYSICAL EXAM:    General: Well developed, well nourished, no acute distress  HEENT: NC/AT; PERRL, anicteric sclera; MMM  Neck: supple  Cardiovascular: +S1/S2, RRR, no murmurs, rubs, gallops  Respiratory: CTA B/L; no W/R/R  Gastrointestinal: soft, NT/ND; +BSx4  Extremities: WWP; no edema, clubbing or cyanosis  Vascular: 2+ radial, DP/PT pulses B/L  Neurological: AAOx3; no focal deficits    MEDICATIONS:  MEDICATIONS  (STANDING):  aspirin  chewable 81 milliGRAM(s) Oral daily  atorvastatin 80 milliGRAM(s) Oral every 24 hours  carvedilol 6.25 milliGRAM(s) Oral every 12 hours  cefTRIAXone   IVPB 2000 milliGRAM(s) IV Intermittent every 24 hours  chlorhexidine 0.12% Liquid 15 milliLiter(s) Oral Mucosa every 12 hours  chlorhexidine 2% Cloths 1 Application(s) Topical <User Schedule>  clopidogrel Tablet 75 milliGRAM(s) Oral every 24 hours  dexMEDEtomidine Infusion 0.1 MICROgram(s)/kG/Hr (3.02 mL/Hr) IV Continuous <Continuous>  dextrose 40% Gel 15 Gram(s) Oral once  dextrose 5%. 1000 milliLiter(s) (50 mL/Hr) IV Continuous <Continuous>  dextrose 5%. 1000 milliLiter(s) (100 mL/Hr) IV Continuous <Continuous>  dextrose 50% Injectable 25 Gram(s) IV Push once  dextrose 50% Injectable 12.5 Gram(s) IV Push once  dextrose 50% Injectable 25 Gram(s) IV Push once  fentaNYL   Infusion. 0.5 MICROgram(s)/kG/Hr (6.04 mL/Hr) IV Continuous <Continuous>  glucagon  Injectable 1 milliGRAM(s) IntraMuscular once  heparin  Infusion 1800 Unit(s)/Hr (18 mL/Hr) IV Continuous <Continuous>  insulin regular  human corrective regimen sliding scale   SubCutaneous every 6 hours  pantoprazole  Injectable 40 milliGRAM(s) IV Push every 24 hours  propofol Infusion 5 MICROgram(s)/kG/Min (3.27 mL/Hr) IV Continuous <Continuous>    MEDICATIONS  (PRN):      ALLERGIES:  Allergies    No Known Allergies    Intolerances        LABS:                        10.7   9.36  )-----------( 135      ( 22 Apr 2021 05:10 )             33.9     04-22    134<L>  |  92<L>  |  44<H>  ----------------------------<  189<H>  4.1   |  23  |  6.24<H>    Ca    8.4      22 Apr 2021 05:10  Phos  7.3     04-22  Mg     2.0     04-22    TPro  7.4  /  Alb  3.6  /  TBili  0.4  /  DBili  x   /  AST  16  /  ALT  11  /  AlkPhos  84  04-22    PT/INR - ( 22 Apr 2021 05:10 )   PT: 14.8 sec;   INR: 1.25          PTT - ( 22 Apr 2021 05:10 )  PTT:79.5 sec    CAPILLARY BLOOD GLUCOSE      POCT Blood Glucose.: 180 mg/dL (22 Apr 2021 04:54)      RADIOLOGY & ADDITIONAL TESTS: Reviewed.    PLAN:  OVERNIGHT EVENTS: Started on coreg 6.25. Started briefly on fentanyl and increased fentanyl as he was waking up.  Decreased sedation in AM. Fentanyl stopped. Propofol decreased. Followed commands but was mildly agitated. Added precedex. Now on Precedex 0.3 and Propofol 20. On CPAP tolerating it well.     SUBJECTIVE / INTERVAL HPI: Patient seen and examined at bedside. He was lying down in bed intubated. His eyes open and left hands moving. Unable to elicit ROS due to his mental status.     VITAL SIGNS:  Vital Signs Last 24 Hrs  T(C): 37.1 (22 Apr 2021 02:10), Max: 38.2 (21 Apr 2021 09:49)  T(F): 98.7 (22 Apr 2021 02:10), Max: 100.7 (21 Apr 2021 09:49)  HR: 91 (22 Apr 2021 06:12) (84 - 104)  BP: --  BP(mean): --  RR: 21 (22 Apr 2021 06:12) (10 - 31)  SpO2: 96% (22 Apr 2021 06:12) (93% - 100%)    PHYSICAL EXAM:    General: Well developed, well nourished, no acute distress, Intubated.   HEENT: eyes open   Neck: supple  Cardiovascular: +S1/S2, RRR, no murmurs, rubs, gallops  Respiratory: Clear anteriorly but given intubated and unable to move patient unable to listen to the lung from the back.   Gastrointestinal: soft, NT/ND; +BSx4, Obese   : [place] cath no urine in the bag   Extremities: no clubbing or cyanosis; 1+ peripheral edema b/l knees; feet b/l cold L>R; s/p well-healed L toe amputation; R arm w/ fistula for dialysis  Musculoskeletal: no joint swelling or erythema, Right femoral scare s/p CABG vein removal   Vascular: 2+ radial, femoral pulses B/L; weak DP/PT pulses  Dermatologic: skin warm, dry and intact; no rashes, wounds; b/l LE hyperpigmented up to mid-tibia; multiple scars from ?surgeries w/ some hypertrophic scar tissue/?keloid noted on chest  Lymphatic: no submandibular or cervical LAD  Neurologic: Sedated       MEDICATIONS:  MEDICATIONS  (STANDING):  aspirin  chewable 81 milliGRAM(s) Oral daily  atorvastatin 80 milliGRAM(s) Oral every 24 hours  carvedilol 6.25 milliGRAM(s) Oral every 12 hours  cefTRIAXone   IVPB 2000 milliGRAM(s) IV Intermittent every 24 hours  chlorhexidine 0.12% Liquid 15 milliLiter(s) Oral Mucosa every 12 hours  chlorhexidine 2% Cloths 1 Application(s) Topical <User Schedule>  clopidogrel Tablet 75 milliGRAM(s) Oral every 24 hours  dexMEDEtomidine Infusion 0.1 MICROgram(s)/kG/Hr (3.02 mL/Hr) IV Continuous <Continuous>  dextrose 40% Gel 15 Gram(s) Oral once  dextrose 5%. 1000 milliLiter(s) (50 mL/Hr) IV Continuous <Continuous>  dextrose 5%. 1000 milliLiter(s) (100 mL/Hr) IV Continuous <Continuous>  dextrose 50% Injectable 25 Gram(s) IV Push once  dextrose 50% Injectable 12.5 Gram(s) IV Push once  dextrose 50% Injectable 25 Gram(s) IV Push once  fentaNYL   Infusion. 0.5 MICROgram(s)/kG/Hr (6.04 mL/Hr) IV Continuous <Continuous>  glucagon  Injectable 1 milliGRAM(s) IntraMuscular once  heparin  Infusion 1800 Unit(s)/Hr (18 mL/Hr) IV Continuous <Continuous>  insulin regular  human corrective regimen sliding scale   SubCutaneous every 6 hours  pantoprazole  Injectable 40 milliGRAM(s) IV Push every 24 hours  propofol Infusion 5 MICROgram(s)/kG/Min (3.27 mL/Hr) IV Continuous <Continuous>    MEDICATIONS  (PRN):      ALLERGIES:  Allergies    No Known Allergies    Intolerances        LABS:                        10.7   9.36  )-----------( 135      ( 22 Apr 2021 05:10 )             33.9     04-22    134<L>  |  92<L>  |  44<H>  ----------------------------<  189<H>  4.1   |  23  |  6.24<H>    Ca    8.4      22 Apr 2021 05:10  Phos  7.3     04-22  Mg     2.0     04-22    TPro  7.4  /  Alb  3.6  /  TBili  0.4  /  DBili  x   /  AST  16  /  ALT  11  /  AlkPhos  84  04-22    PT/INR - ( 22 Apr 2021 05:10 )   PT: 14.8 sec;   INR: 1.25          PTT - ( 22 Apr 2021 05:10 )  PTT:79.5 sec    CAPILLARY BLOOD GLUCOSE      POCT Blood Glucose.: 180 mg/dL (22 Apr 2021 04:54)      RADIOLOGY & ADDITIONAL TESTS: Reviewed.    PLAN:

## 2021-04-22 NOTE — CONSULT NOTE ADULT - SUBJECTIVE AND OBJECTIVE BOX
HPI: 51M with PMH of ESRD (HD MWF), CAD (s/p CABGx4, stents), HTN, pacemaker who presented with SOB that progressed to acute respiratory failure and required intubation. Patient presented meeting sepsis criteria with Temperature of 102.4F rectally, , RR 38, and WBC 20. Patient is visiting [place] from [place] and flew here on 4/17 and did not have a dialysis facility set up for him while he was visiting. Patient had CHF exacerbation from missed dialysis resulting in pleural effusions and he was treated with HD. He was also empirically treated with 1 dose of vancomycin, 2 doses of Zosyn for sepsis and then started on Ceftriaxone and given one dose of Azithromycin for supposed pneumonia. COVID PCR was negative 3 times. WBC has improved and patient appears clinically improved as well. Patient had another fever of 101.5F this morning at 8:30 AM and is now status post extubation. Patient denies chest pain, shortness of breath, nausea, vomiting, abdominal pain, diarrhea, constipation, or dysuria.      PAST MEDICAL & SURGICAL HISTORY:  ESRD (end stage renal disease) on dialysis    HTN (hypertension)    CAD (coronary artery disease)    S/P CABG x 4        Home Medications:  aspirin 81 mg oral delayed release tablet: 1 tab(s) orally once a day (22 Apr 2021 13:15)  Crestor 40 mg oral tablet: 1 tab(s) orally once a day (22 Apr 2021 13:15)  Dilaudid 4 mg oral tablet: 1 tab(s) orally every 4 hours (22 Apr 2021 13:15)  Effient 10 mg oral tablet: 1 tab(s) orally once a day (22 Apr 2021 13:15)  furosemide 80 mg oral tablet: 1 tab(s) orally once a day (22 Apr 2021 13:15)  HumaLOG KwikPen 100 units/mL injectable solution: 15 unit(s) injectable 3 times a day (22 Apr 2021 13:15)  hydrALAZINE 25 mg oral tablet: 1 tab(s) orally 4 times a day (22 Apr 2021 13:15)  isosorbide mononitrate 60 mg oral tablet, extended release: 1 tab(s) orally once a day (in the morning) (22 Apr 2021 13:15)  Lantus Solostar Pen 100 units/mL subcutaneous solution: 80 unit(s) subcutaneous once a day (at bedtime) (22 Apr 2021 13:15)  metoprolol tartrate 100 mg oral tablet: 1 tab(s) orally 2 times a day (22 Apr 2021 13:15)  Neurontin 600 mg oral tablet: 1  orally 2 times a day (22 Apr 2021 13:15)  nitroglycerin 0.4 mg sublingual tablet: sublingual at the sign of attack may repeat every 5 min until relief  (22 Apr 2021 13:15)  omeprazole 20 mg oral delayed release capsule: 2 cap(s) orally once a day (22 Apr 2021 13:15)      OTHER MEDICATIONS:  aspirin  chewable 81 milliGRAM(s) Oral daily  atorvastatin 80 milliGRAM(s) Oral every 24 hours  carvedilol 6.25 milliGRAM(s) Oral every 12 hours  chlorhexidine 2% Cloths 1 Application(s) Topical <User Schedule>  heparin   Injectable 5000 Unit(s) SubCutaneous every 8 hours  hydrALAZINE 25 milliGRAM(s) Oral three times a day  insulin glargine Injectable (LANTUS) 25 Unit(s) SubCutaneous at bedtime  insulin lispro Injectable (ADMELOG) 5 Unit(s) SubCutaneous three times a day before meals  insulin regular  human corrective regimen sliding scale   SubCutaneous every 6 hours  pantoprazole    Tablet 40 milliGRAM(s) Oral before breakfast  sevelamer carbonate 800 milliGRAM(s) Oral three times a day with meals      ALLERGIES:    No Known Allergies or Intolerances          Social History:  Lives in [place].      VITAL SIGNS:  Vital Signs Last 24 Hrs  T(C): 38.1 (22 Apr 2021 19:00), Max: 38.6 (22 Apr 2021 08:30)  T(F): 100.6 (22 Apr 2021 19:00), Max: 101.5 (22 Apr 2021 08:30)  HR: 91 (22 Apr 2021 20:00) (80 - 97)  BP: 157/72 (22 Apr 2021 19:00) (132/64 - 162/75)  BP(mean): 103 (22 Apr 2021 19:00) (92 - 108)  RR: 23 (22 Apr 2021 20:00) (10 - 28)  SpO2: 96% (22 Apr 2021 20:00) (92% - 100%)    PHYSICAL EXAM:  General: obese, in NAD, lying comfortably in bed  HEENT: PERRL, anicteric sclera; Dry MM  Neck: supple  Cardiovascular: +S1/S2, RRR, midline sternal scar  Respiratory: clear to auscultation B/L  Gastrointestinal: soft, NT/ND  Extremities: WWP; bilateral LE with hyperpigmentation; amputated 1st toe on left foot and 4th toe on right foot  Vascular: 2+ radial pulses B/L; right arm fistula  Neurological: AAOx3; no focal deficits    LABS:                        10.7   9.36  )-----------( 135      ( 22 Apr 2021 05:10 )             33.9     04-22    134<L>  |  92<L>  |  44<H>  ----------------------------<  189<H>  4.1   |  23  |  6.24<H>    Ca    8.4      22 Apr 2021 05:10  Phos  7.3     04-22  Mg     2.0     04-22    TPro  7.4  /  Alb  3.6  /  TBili  0.4  /  DBili  x   /  AST  16  /  ALT  11  /  AlkPhos  84  04-22    PT/INR - ( 22 Apr 2021 05:10 )   PT: 14.8 sec;   INR: 1.25          PTT - ( 22 Apr 2021 05:10 )  PTT:79.5 sec    MICROBIOLOGY:  Culture - Blood (04.20.21 @ 09:08)   No growth at 2 days.       RADIOLOGY & ADDITIONAL STUDIES: Reviewed.  < from: TTE Echo Complete w/o Doppler (04.22.21 @ 10:49) >  CONCLUSIONS:     1. Basal and mid inferior septum and basal and mid inferior wall are abnormal.   2. Moderately reduced left ventricular systolic function with and ejection fraction of 35-40%.   3. Regional wall motion abnormalities consistent with ischemic heart disease.   4. Normal right ventricular size and systolic function.   5. Normal atria.   6. Moderate aortic stenosis.   7. No pericardial effusion.    < end of copied text >

## 2021-04-22 NOTE — CONSULT NOTE ADULT - ATTENDING COMMENTS
As above.  Briefly, 52 yo M with HTN, DM, CAD s/pCABG with ICD in place, AS admitted with volume overload after missing HD.  He had initial T 102.4 with initial WBC 20K.  He received one dose of vanc/pip-tazo then ceftriaxone.  His temp initially came down, now increasing again.  No localizing signs/symptoms at present.   Blood cultures are negative for 2 d but he is at high risk for having bacterial process with healthcare-associated bacteria.  Nasal PCR detected MSSA.  CXR does not show pneumonia, UA neg and no urinary symptoms.  Would continue to f/u blood cultures, do CT A/P as next step in evaluation, restart vanc & pip-tazo.  Recommendations discussed with primary team.  Will follow with you - team 1.
pt seen and examined personally   please see addendum in h and p
Initial attending contact date  4/20/21    . See fellow note written above for details. I reviewed the fellow documentation. I have personally seen and examined this patient. I reviewed vitals, labs, medications, cardiac studies, and additional imaging. I agree with the above fellow's findings and plans as written above with the following additions/statements.    51 year old M with PMHx of ICM with unknown baseline EF 2/2 CAD s/p CABG and PCI, Moderate AS, HTN, HLD, ESRD on HD who presented to the ER with severe SOB, found to be in Hypoxic respiratory Failure with electrolyte disturbances, s/p Urgent dialysis, now with troponemia and Ischemic EKG changes for which cardiology is being consulted.       -Pt with evolving NSTEMI with EKG changes consistent with inferolateral ischemia  -Repeat CXR with increased fluid overload  -Pt possibly with acute coronary event/NSTEMI leading to pulm edema  -Unclear if pt makes urine given ESRD  -Would trial with lasix 80 mg IV x 1, if no urine output might need addition HD session  -Treat NSTEMI with ASA/plavix load/heparin/high dose statin  -Would hold off on beta blocker given acute CHF exacerbation  -Repeat official ECHO  -Transfer to CCU for further management

## 2021-04-22 NOTE — CONSULT NOTE ADULT - ASSESSMENT
51M with PMH of ESRD (HD MWF), CAD (s/p CABGx4, stents), HTN, pacemaker who presented with SOB that progressed to acute respiratory failure and required intubation. Patient presented meeting sepsis criteria with Temperature of 102.4F rectally, , RR 38, and WBC 20. Patient is visiting [place] from [place] and flew here on 4/17, COVID PCR was negative 3 times. WBC has improved. Patient had another fever of 101.5F this morning at 8:30 AM and is now status post extubation. Blood cultures with no growth for 2 days.    Recommendations:  - follow up repeat blood cultures from 4/22  - restart Zosyn with renal dosing  - check random Vancomycin level and if <15 give a dose now  - CT C/A/P with and without IV contrast and with PO contrast    Recommendations discussed with primary team. ID Team 1 will continue to follow. 51M with PMH of ESRD (HD MWF), CAD (s/p CABGx4, stents), HTN, pacemaker who presented with SOB that progressed to acute respiratory failure and required intubation. Patient presented meeting sepsis criteria with Temperature of 102.4F rectally, , RR 38, and WBC 20. Patient is visiting [place] from [place] and flew here on 4/17, COVID PCR was negative 3 times. WBC has improved. Patient had another fever of 101.5F this morning at 8:30 AM and is now status post extubation. Blood cultures with no growth for 2 days.    Recommendations:  - follow up repeat blood cultures from 4/22  - restart Zosyn 2.25 g IV q8h  - check random Vancomycin level and if <15 give a dose now  - CT C/A/P with and without IV contrast and with PO contrast    Recommendations discussed with primary team. ID Team 1 will continue to follow.

## 2021-04-23 VITALS
HEART RATE: 97 BPM | DIASTOLIC BLOOD PRESSURE: 69 MMHG | OXYGEN SATURATION: 93 % | SYSTOLIC BLOOD PRESSURE: 143 MMHG | RESPIRATION RATE: 20 BRPM

## 2021-04-23 LAB
ALBUMIN SERPL ELPH-MCNC: 3.8 G/DL — SIGNIFICANT CHANGE UP (ref 3.3–5)
ALP SERPL-CCNC: 79 U/L — SIGNIFICANT CHANGE UP (ref 40–120)
ALT FLD-CCNC: 9 U/L — LOW (ref 10–45)
ANION GAP SERPL CALC-SCNC: 21 MMOL/L — HIGH (ref 5–17)
APPEARANCE UR: CLEAR — SIGNIFICANT CHANGE UP
APTT BLD: 29.4 SEC — SIGNIFICANT CHANGE UP (ref 27.5–35.5)
AST SERPL-CCNC: 19 U/L — SIGNIFICANT CHANGE UP (ref 10–40)
BACTERIA # UR AUTO: PRESENT /HPF
BILIRUB SERPL-MCNC: 0.4 MG/DL — SIGNIFICANT CHANGE UP (ref 0.2–1.2)
BILIRUB UR-MCNC: NEGATIVE — SIGNIFICANT CHANGE UP
BUN SERPL-MCNC: 65 MG/DL — HIGH (ref 7–23)
CALCIUM SERPL-MCNC: 8.5 MG/DL — SIGNIFICANT CHANGE UP (ref 8.4–10.5)
CHLORIDE SERPL-SCNC: 92 MMOL/L — LOW (ref 96–108)
CO2 SERPL-SCNC: 22 MMOL/L — SIGNIFICANT CHANGE UP (ref 22–31)
COLOR SPEC: YELLOW — SIGNIFICANT CHANGE UP
COMMENT - URINE: SIGNIFICANT CHANGE UP
CREAT SERPL-MCNC: 7.68 MG/DL — HIGH (ref 0.5–1.3)
DIFF PNL FLD: ABNORMAL
EPI CELLS # UR: SIGNIFICANT CHANGE UP /HPF (ref 0–5)
GLUCOSE BLDC GLUCOMTR-MCNC: 117 MG/DL — HIGH (ref 70–99)
GLUCOSE BLDC GLUCOMTR-MCNC: 155 MG/DL — HIGH (ref 70–99)
GLUCOSE SERPL-MCNC: 165 MG/DL — HIGH (ref 70–99)
GLUCOSE UR QL: 100
HCT VFR BLD CALC: 32 % — LOW (ref 39–50)
HGB BLD-MCNC: 10.4 G/DL — LOW (ref 13–17)
INR BLD: 1.17 — HIGH (ref 0.88–1.16)
KETONES UR-MCNC: NEGATIVE — SIGNIFICANT CHANGE UP
LEUKOCYTE ESTERASE UR-ACNC: NEGATIVE — SIGNIFICANT CHANGE UP
MAGNESIUM SERPL-MCNC: 2.3 MG/DL — SIGNIFICANT CHANGE UP (ref 1.6–2.6)
MCHC RBC-ENTMCNC: 27.5 PG — SIGNIFICANT CHANGE UP (ref 27–34)
MCHC RBC-ENTMCNC: 32.5 GM/DL — SIGNIFICANT CHANGE UP (ref 32–36)
MCV RBC AUTO: 84.7 FL — SIGNIFICANT CHANGE UP (ref 80–100)
NITRITE UR-MCNC: NEGATIVE — SIGNIFICANT CHANGE UP
NRBC # BLD: 0 /100 WBCS — SIGNIFICANT CHANGE UP (ref 0–0)
PH UR: 6.5 — SIGNIFICANT CHANGE UP (ref 5–8)
PHOSPHATE SERPL-MCNC: 8.1 MG/DL — HIGH (ref 2.5–4.5)
PLATELET # BLD AUTO: 133 K/UL — LOW (ref 150–400)
POTASSIUM SERPL-MCNC: 3.8 MMOL/L — SIGNIFICANT CHANGE UP (ref 3.5–5.3)
POTASSIUM SERPL-SCNC: 3.8 MMOL/L — SIGNIFICANT CHANGE UP (ref 3.5–5.3)
PROT SERPL-MCNC: 7.5 G/DL — SIGNIFICANT CHANGE UP (ref 6–8.3)
PROT UR-MCNC: 100 MG/DL
PROTHROM AB SERPL-ACNC: 13.9 SEC — HIGH (ref 10.6–13.6)
RBC # BLD: 3.78 M/UL — LOW (ref 4.2–5.8)
RBC # FLD: 14.6 % — HIGH (ref 10.3–14.5)
RBC CASTS # UR COMP ASSIST: < 5 /HPF — SIGNIFICANT CHANGE UP
SODIUM SERPL-SCNC: 135 MMOL/L — SIGNIFICANT CHANGE UP (ref 135–145)
SP GR SPEC: 1.02 — SIGNIFICANT CHANGE UP (ref 1–1.03)
UROBILINOGEN FLD QL: 0.2 E.U./DL — SIGNIFICANT CHANGE UP
WBC # BLD: 8.84 K/UL — SIGNIFICANT CHANGE UP (ref 3.8–10.5)
WBC # FLD AUTO: 8.84 K/UL — SIGNIFICANT CHANGE UP (ref 3.8–10.5)
WBC UR QL: ABNORMAL /HPF

## 2021-04-23 PROCEDURE — 84295 ASSAY OF SERUM SODIUM: CPT

## 2021-04-23 PROCEDURE — 99292 CRITICAL CARE ADDL 30 MIN: CPT

## 2021-04-23 PROCEDURE — 87641 MR-STAPH DNA AMP PROBE: CPT

## 2021-04-23 PROCEDURE — 86704 HEP B CORE ANTIBODY TOTAL: CPT

## 2021-04-23 PROCEDURE — 84478 ASSAY OF TRIGLYCERIDES: CPT

## 2021-04-23 PROCEDURE — 0225U NFCT DS DNA&RNA 21 SARSCOV2: CPT

## 2021-04-23 PROCEDURE — 71045 X-RAY EXAM CHEST 1 VIEW: CPT | Mod: 26

## 2021-04-23 PROCEDURE — 99239 HOSP IP/OBS DSCHRG MGMT >30: CPT

## 2021-04-23 PROCEDURE — 83605 ASSAY OF LACTIC ACID: CPT

## 2021-04-23 PROCEDURE — 99233 SBSQ HOSP IP/OBS HIGH 50: CPT

## 2021-04-23 PROCEDURE — 83036 HEMOGLOBIN GLYCOSYLATED A1C: CPT

## 2021-04-23 PROCEDURE — 86850 RBC ANTIBODY SCREEN: CPT

## 2021-04-23 PROCEDURE — 82652 VIT D 1 25-DIHYDROXY: CPT

## 2021-04-23 PROCEDURE — 82550 ASSAY OF CK (CPK): CPT

## 2021-04-23 PROCEDURE — 84132 ASSAY OF SERUM POTASSIUM: CPT

## 2021-04-23 PROCEDURE — 85027 COMPLETE CBC AUTOMATED: CPT

## 2021-04-23 PROCEDURE — 96374 THER/PROPH/DIAG INJ IV PUSH: CPT

## 2021-04-23 PROCEDURE — 84443 ASSAY THYROID STIM HORMONE: CPT

## 2021-04-23 PROCEDURE — 86769 SARS-COV-2 COVID-19 ANTIBODY: CPT

## 2021-04-23 PROCEDURE — 93308 TTE F-UP OR LMTD: CPT

## 2021-04-23 PROCEDURE — 94660 CPAP INITIATION&MGMT: CPT

## 2021-04-23 PROCEDURE — 82330 ASSAY OF CALCIUM: CPT

## 2021-04-23 PROCEDURE — 80061 LIPID PANEL: CPT

## 2021-04-23 PROCEDURE — 81001 URINALYSIS AUTO W/SCOPE: CPT

## 2021-04-23 PROCEDURE — 86706 HEP B SURFACE ANTIBODY: CPT

## 2021-04-23 PROCEDURE — 83970 ASSAY OF PARATHORMONE: CPT

## 2021-04-23 PROCEDURE — 84484 ASSAY OF TROPONIN QUANT: CPT

## 2021-04-23 PROCEDURE — 82607 VITAMIN B-12: CPT

## 2021-04-23 PROCEDURE — 82310 ASSAY OF CALCIUM: CPT

## 2021-04-23 PROCEDURE — 83880 ASSAY OF NATRIURETIC PEPTIDE: CPT

## 2021-04-23 PROCEDURE — 82962 GLUCOSE BLOOD TEST: CPT

## 2021-04-23 PROCEDURE — 84100 ASSAY OF PHOSPHORUS: CPT

## 2021-04-23 PROCEDURE — 87640 STAPH A DNA AMP PROBE: CPT

## 2021-04-23 PROCEDURE — 90935 HEMODIALYSIS ONE EVALUATION: CPT

## 2021-04-23 PROCEDURE — 85379 FIBRIN DEGRADATION QUANT: CPT

## 2021-04-23 PROCEDURE — 99291 CRITICAL CARE FIRST HOUR: CPT | Mod: 25

## 2021-04-23 PROCEDURE — 87635 SARS-COV-2 COVID-19 AMP PRB: CPT

## 2021-04-23 PROCEDURE — 83735 ASSAY OF MAGNESIUM: CPT

## 2021-04-23 PROCEDURE — 86901 BLOOD TYPING SEROLOGIC RH(D): CPT

## 2021-04-23 PROCEDURE — 85610 PROTHROMBIN TIME: CPT

## 2021-04-23 PROCEDURE — 82728 ASSAY OF FERRITIN: CPT

## 2021-04-23 PROCEDURE — U0003: CPT

## 2021-04-23 PROCEDURE — 82306 VITAMIN D 25 HYDROXY: CPT

## 2021-04-23 PROCEDURE — 71045 X-RAY EXAM CHEST 1 VIEW: CPT

## 2021-04-23 PROCEDURE — 94003 VENT MGMT INPAT SUBQ DAY: CPT

## 2021-04-23 PROCEDURE — 82803 BLOOD GASES ANY COMBINATION: CPT

## 2021-04-23 PROCEDURE — 83540 ASSAY OF IRON: CPT

## 2021-04-23 PROCEDURE — 86900 BLOOD TYPING SEROLOGIC ABO: CPT

## 2021-04-23 PROCEDURE — 96375 TX/PRO/DX INJ NEW DRUG ADDON: CPT

## 2021-04-23 PROCEDURE — 36415 COLL VENOUS BLD VENIPUNCTURE: CPT

## 2021-04-23 PROCEDURE — 87040 BLOOD CULTURE FOR BACTERIA: CPT

## 2021-04-23 PROCEDURE — 84145 PROCALCITONIN (PCT): CPT

## 2021-04-23 PROCEDURE — 85730 THROMBOPLASTIN TIME PARTIAL: CPT

## 2021-04-23 PROCEDURE — 93307 TTE W/O DOPPLER COMPLETE: CPT

## 2021-04-23 PROCEDURE — 80053 COMPREHEN METABOLIC PANEL: CPT

## 2021-04-23 PROCEDURE — 83550 IRON BINDING TEST: CPT

## 2021-04-23 PROCEDURE — 87340 HEPATITIS B SURFACE AG IA: CPT

## 2021-04-23 PROCEDURE — U0005: CPT

## 2021-04-23 PROCEDURE — 86140 C-REACTIVE PROTEIN: CPT

## 2021-04-23 PROCEDURE — 86803 HEPATITIS C AB TEST: CPT

## 2021-04-23 PROCEDURE — 85025 COMPLETE CBC W/AUTO DIFF WBC: CPT

## 2021-04-23 PROCEDURE — 82553 CREATINE MB FRACTION: CPT

## 2021-04-23 PROCEDURE — 80307 DRUG TEST PRSMV CHEM ANLYZR: CPT

## 2021-04-23 RX ORDER — HYDROMORPHONE HYDROCHLORIDE 2 MG/ML
1 INJECTION INTRAMUSCULAR; INTRAVENOUS; SUBCUTANEOUS
Qty: 0 | Refills: 0 | DISCHARGE

## 2021-04-23 RX ORDER — ENOXAPARIN SODIUM 100 MG/ML
80 INJECTION SUBCUTANEOUS
Qty: 0 | Refills: 0 | DISCHARGE

## 2021-04-23 RX ORDER — PRASUGREL 5 MG/1
1 TABLET, FILM COATED ORAL
Qty: 30 | Refills: 1
Start: 2021-04-23 | End: 2021-06-21

## 2021-04-23 RX ORDER — ACETAMINOPHEN 500 MG
650 TABLET ORAL EVERY 6 HOURS
Refills: 0 | Status: DISCONTINUED | OUTPATIENT
Start: 2021-04-23 | End: 2021-04-23

## 2021-04-23 RX ORDER — METOPROLOL TARTRATE 50 MG
1 TABLET ORAL
Qty: 0 | Refills: 0 | DISCHARGE

## 2021-04-23 RX ORDER — PRASUGREL 5 MG/1
1 TABLET, FILM COATED ORAL
Qty: 0 | Refills: 0 | DISCHARGE

## 2021-04-23 RX ORDER — ASPIRIN/CALCIUM CARB/MAGNESIUM 324 MG
1 TABLET ORAL
Qty: 30 | Refills: 1
Start: 2021-04-23 | End: 2021-06-21

## 2021-04-23 RX ORDER — CARVEDILOL PHOSPHATE 80 MG/1
6.25 CAPSULE, EXTENDED RELEASE ORAL ONCE
Refills: 0 | Status: COMPLETED | OUTPATIENT
Start: 2021-04-23 | End: 2021-04-23

## 2021-04-23 RX ORDER — ISOSORBIDE MONONITRATE 60 MG/1
1 TABLET, EXTENDED RELEASE ORAL
Qty: 0 | Refills: 0 | DISCHARGE

## 2021-04-23 RX ORDER — CARVEDILOL PHOSPHATE 80 MG/1
1 CAPSULE, EXTENDED RELEASE ORAL
Qty: 60 | Refills: 1
Start: 2021-04-23 | End: 2021-06-21

## 2021-04-23 RX ORDER — CARVEDILOL PHOSPHATE 80 MG/1
1 CAPSULE, EXTENDED RELEASE ORAL
Qty: 60 | Refills: 0
Start: 2021-04-23 | End: 2021-05-22

## 2021-04-23 RX ORDER — SEVELAMER CARBONATE 2400 MG/1
1 POWDER, FOR SUSPENSION ORAL
Qty: 90 | Refills: 0
Start: 2021-04-23 | End: 2021-05-22

## 2021-04-23 RX ORDER — GABAPENTIN 400 MG/1
1 CAPSULE ORAL
Qty: 0 | Refills: 0 | DISCHARGE

## 2021-04-23 RX ORDER — ISOSORBIDE MONONITRATE 60 MG/1
1 TABLET, EXTENDED RELEASE ORAL
Qty: 30 | Refills: 0
Start: 2021-04-23 | End: 2021-05-22

## 2021-04-23 RX ORDER — OMEPRAZOLE 10 MG/1
2 CAPSULE, DELAYED RELEASE ORAL
Qty: 0 | Refills: 0 | DISCHARGE

## 2021-04-23 RX ORDER — INSULIN LISPRO 100/ML
15 VIAL (ML) SUBCUTANEOUS
Qty: 0 | Refills: 0 | DISCHARGE

## 2021-04-23 RX ORDER — FUROSEMIDE 40 MG
1 TABLET ORAL
Qty: 0 | Refills: 0 | DISCHARGE

## 2021-04-23 RX ORDER — ROSUVASTATIN CALCIUM 5 MG/1
1 TABLET ORAL
Qty: 0 | Refills: 0 | DISCHARGE

## 2021-04-23 RX ORDER — HYDRALAZINE HCL 50 MG
1 TABLET ORAL
Qty: 0 | Refills: 0 | DISCHARGE

## 2021-04-23 RX ORDER — NITROGLYCERIN 6.5 MG
0 CAPSULE, EXTENDED RELEASE ORAL
Qty: 0 | Refills: 0 | DISCHARGE

## 2021-04-23 RX ORDER — IRON SUCROSE 20 MG/ML
200 INJECTION, SOLUTION INTRAVENOUS ONCE
Refills: 0 | Status: COMPLETED | OUTPATIENT
Start: 2021-04-23 | End: 2021-04-23

## 2021-04-23 RX ORDER — ASPIRIN/CALCIUM CARB/MAGNESIUM 324 MG
1 TABLET ORAL
Qty: 0 | Refills: 0 | DISCHARGE

## 2021-04-23 RX ADMIN — Medication 81 MILLIGRAM(S): at 12:12

## 2021-04-23 RX ADMIN — IRON SUCROSE 110 MILLIGRAM(S): 20 INJECTION, SOLUTION INTRAVENOUS at 11:28

## 2021-04-23 RX ADMIN — Medication 25 MILLIGRAM(S): at 15:25

## 2021-04-23 RX ADMIN — PRASUGREL 10 MILLIGRAM(S): 5 TABLET, FILM COATED ORAL at 12:12

## 2021-04-23 RX ADMIN — SEVELAMER CARBONATE 800 MILLIGRAM(S): 2400 POWDER, FOR SUSPENSION ORAL at 07:44

## 2021-04-23 RX ADMIN — HEPARIN SODIUM 5000 UNIT(S): 5000 INJECTION INTRAVENOUS; SUBCUTANEOUS at 12:18

## 2021-04-23 RX ADMIN — CARVEDILOL PHOSPHATE 6.25 MILLIGRAM(S): 80 CAPSULE, EXTENDED RELEASE ORAL at 15:28

## 2021-04-23 RX ADMIN — Medication 25 MILLIGRAM(S): at 05:27

## 2021-04-23 RX ADMIN — Medication 5 UNIT(S): at 07:11

## 2021-04-23 RX ADMIN — Medication 650 MILLIGRAM(S): at 04:14

## 2021-04-23 RX ADMIN — INSULIN HUMAN 2: 100 INJECTION, SOLUTION SUBCUTANEOUS at 06:12

## 2021-04-23 RX ADMIN — PANTOPRAZOLE SODIUM 40 MILLIGRAM(S): 20 TABLET, DELAYED RELEASE ORAL at 06:12

## 2021-04-23 RX ADMIN — HEPARIN SODIUM 5000 UNIT(S): 5000 INJECTION INTRAVENOUS; SUBCUTANEOUS at 05:27

## 2021-04-23 RX ADMIN — CHLORHEXIDINE GLUCONATE 1 APPLICATION(S): 213 SOLUTION TOPICAL at 07:11

## 2021-04-23 RX ADMIN — CARVEDILOL PHOSPHATE 6.25 MILLIGRAM(S): 80 CAPSULE, EXTENDED RELEASE ORAL at 14:30

## 2021-04-23 RX ADMIN — Medication 650 MILLIGRAM(S): at 03:14

## 2021-04-23 NOTE — DISCHARGE NOTE NURSING/CASE MANAGEMENT/SOCIAL WORK - NSDCFUADDAPPT_GEN_ALL_CORE_FT
Please follow up with the dialysis (HD) center at [address] on Monday at 3 PM.     Address: [facility] [address] [facility] [place], [place], [facility]  Phone: [phone]

## 2021-04-23 NOTE — DIETITIAN INITIAL EVALUATION ADULT. - OTHER CALCULATIONS
Ideal body weight used for calculations as pt >120% of IBW. (134% IBW), Nutrient needs based on [facility] standards of care for maintenance in adults, adjusted for age, HD needs, fluid per team

## 2021-04-23 NOTE — PROGRESS NOTE ADULT - SUBJECTIVE AND OBJECTIVE BOX
OVERNIGHT EVENTS:    SUBJECTIVE / INTERVAL HPI: Patient seen and examined at bedside.     VITAL SIGNS:  Vital Signs Last 24 Hrs  T(C): 37.3 (2021 04:51), Max: 38.6 (2021 08:30)  T(F): 99.1 (2021 04:51), Max: 101.5 (2021 08:30)  HR: 90 (2021 04:00) (80 - 97)  BP: 155/71 (2021 00:00) (132/64 - 162/75)  BP(mean): 102 (2021 00:00) (92 - 108)  RR: 19 (2021 04:00) (10 - 37)  SpO2: 99% (2021 04:00) (92% - 99%)    PHYSICAL EXAM:    General: Well developed, well nourished, no acute distress  HEENT: NC/AT; PERRL, anicteric sclera; MMM  Neck: supple  Cardiovascular: +S1/S2, RRR, no murmurs, rubs, gallops  Respiratory: CTA B/L; no W/R/R  Gastrointestinal: soft, NT/ND; +BSx4  Extremities: WWP; no edema, clubbing or cyanosis  Vascular: 2+ radial, DP/PT pulses B/L  Neurological: AAOx3; no focal deficits    MEDICATIONS:  MEDICATIONS  (STANDING):  aspirin  chewable 81 milliGRAM(s) Oral daily  atorvastatin 80 milliGRAM(s) Oral every 24 hours  carvedilol 6.25 milliGRAM(s) Oral every 12 hours  chlorhexidine 2% Cloths 1 Application(s) Topical <User Schedule>  dextrose 40% Gel 15 Gram(s) Oral once  dextrose 5%. 1000 milliLiter(s) (50 mL/Hr) IV Continuous <Continuous>  dextrose 5%. 1000 milliLiter(s) (100 mL/Hr) IV Continuous <Continuous>  dextrose 50% Injectable 25 Gram(s) IV Push once  dextrose 50% Injectable 12.5 Gram(s) IV Push once  dextrose 50% Injectable 25 Gram(s) IV Push once  glucagon  Injectable 1 milliGRAM(s) IntraMuscular once  heparin   Injectable 5000 Unit(s) SubCutaneous every 8 hours  hydrALAZINE 25 milliGRAM(s) Oral three times a day  insulin glargine Injectable (LANTUS) 25 Unit(s) SubCutaneous at bedtime  insulin lispro Injectable (ADMELOG) 5 Unit(s) SubCutaneous three times a day before meals  insulin regular  human corrective regimen sliding scale   SubCutaneous every 6 hours  pantoprazole    Tablet 40 milliGRAM(s) Oral before breakfast  prasugrel 10 milliGRAM(s) Oral daily  sevelamer carbonate 800 milliGRAM(s) Oral three times a day with meals    MEDICATIONS  (PRN):  acetaminophen   Tablet .. 650 milliGRAM(s) Oral every 6 hours PRN Moderate Pain (4 - 6)      ALLERGIES:  Allergies    No Known Allergies    Intolerances        LABS:                        10.4   8.84  )-----------( 133      ( 2021 05:40 )             32.0         135  |  92<L>  |  65<H>  ----------------------------<  165<H>  3.8   |  22  |  7.68<H>    Ca    8.5      2021 05:40  Phos  8.1       Mg     2.3         TPro  7.5  /  Alb  3.8  /  TBili  0.4  /  DBili  x   /  AST  19  /  ALT  9<L>  /  AlkPhos  79  23    PT/INR - ( 2021 05:40 )   PT: 13.9 sec;   INR: 1.17          PTT - ( 2021 05:40 )  PTT:29.4 sec  Urinalysis Basic - ( 2021 04:15 )    Color: Yellow / Appearance: Clear / S.025 / pH: x  Gluc: x / Ketone: NEGATIVE  / Bili: Negative / Urobili: 0.2 E.U./dL   Blood: x / Protein: 100 mg/dL / Nitrite: NEGATIVE   Leuk Esterase: NEGATIVE / RBC: < 5 /HPF / WBC 5-10 /HPF   Sq Epi: x / Non Sq Epi: 0-5 /HPF / Bacteria: Present /HPF      CAPILLARY BLOOD GLUCOSE      POCT Blood Glucose.: 155 mg/dL (2021 06:01)      RADIOLOGY & ADDITIONAL TESTS: Reviewed.    PLAN:  OVERNIGHT EVENTS: MARGARITA     SUBJECTIVE / INTERVAL HPI: Patient seen and examined at bedside. Lying in bed comfortable and has no complaint. Denies CP, SOB, HA, n/v.    VITAL SIGNS:  Vital Signs Last 24 Hrs  T(C): 37.3 (2021 04:51), Max: 38.6 (2021 08:30)  T(F): 99.1 (2021 04:51), Max: 101.5 (2021 08:30)  HR: 90 (2021 04:00) (80 - 97)  BP: 155/71 (2021 00:00) (132/64 - 162/75)  BP(mean): 102 (2021 00:00) (92 - 108)  RR: 19 (2021 04:00) (10 - 37)  SpO2: 99% (2021 04:00) (92% - 99%)    PHYSICAL EXAM:    eneral: Well developed, well nourished, no acute distress  HEENT: eyes open   Neck: supple  Cardiovascular: +S1/S2, RRR, no murmurs, rubs, gallops  Respiratory: b/l mild crackles   Gastrointestinal: soft, NT/ND; +BSx4, Obese   : [place] cath no urine in the bag   Extremities: no clubbing or cyanosis; 1+ peripheral edema b/l knees; feet b/l cold L>R; s/p well-healed L toe amputation; R arm w/ fistula for dialysis  Musculoskeletal: no joint swelling or erythema, Right femoral scare s/p CABG vein removal   Vascular: 2+ radial, femoral pulses B/L; weak DP/PT pulses  Dermatologic: skin warm, dry and intact; no rashes, wounds; b/l LE hyperpigmented up to mid-tibia; multiple scars from ?surgeries w/ some hypertrophic scar tissue/?keloid noted on chest  Lymphatic: no submandibular or cervical LAD  Neurologic: AAOX3     MEDICATIONS:  MEDICATIONS  (STANDING):  aspirin  chewable 81 milliGRAM(s) Oral daily  atorvastatin 80 milliGRAM(s) Oral every 24 hours  carvedilol 6.25 milliGRAM(s) Oral every 12 hours  chlorhexidine 2% Cloths 1 Application(s) Topical <User Schedule>  dextrose 40% Gel 15 Gram(s) Oral once  dextrose 5%. 1000 milliLiter(s) (50 mL/Hr) IV Continuous <Continuous>  dextrose 5%. 1000 milliLiter(s) (100 mL/Hr) IV Continuous <Continuous>  dextrose 50% Injectable 25 Gram(s) IV Push once  dextrose 50% Injectable 12.5 Gram(s) IV Push once  dextrose 50% Injectable 25 Gram(s) IV Push once  glucagon  Injectable 1 milliGRAM(s) IntraMuscular once  heparin   Injectable 5000 Unit(s) SubCutaneous every 8 hours  hydrALAZINE 25 milliGRAM(s) Oral three times a day  insulin glargine Injectable (LANTUS) 25 Unit(s) SubCutaneous at bedtime  insulin lispro Injectable (ADMELOG) 5 Unit(s) SubCutaneous three times a day before meals  insulin regular  human corrective regimen sliding scale   SubCutaneous every 6 hours  pantoprazole    Tablet 40 milliGRAM(s) Oral before breakfast  prasugrel 10 milliGRAM(s) Oral daily  sevelamer carbonate 800 milliGRAM(s) Oral three times a day with meals    MEDICATIONS  (PRN):  acetaminophen   Tablet .. 650 milliGRAM(s) Oral every 6 hours PRN Moderate Pain (4 - 6)      ALLERGIES:  Allergies    No Known Allergies    Intolerances        LABS:                        10.4   8.84  )-----------( 133      ( 2021 05:40 )             32.0     04-    135  |  92<L>  |  65<H>  ----------------------------<  165<H>  3.8   |  22  |  7.68<H>    Ca    8.5      2021 05:40  Phos  8.1     -  Mg     2.3         TPro  7.5  /  Alb  3.8  /  TBili  0.4  /  DBili  x   /  AST  19  /  ALT  9<L>  /  AlkPhos  79  04-23    PT/INR - ( 2021 05:40 )   PT: 13.9 sec;   INR: 1.17          PTT - ( 2021 05:40 )  PTT:29.4 sec  Urinalysis Basic - ( 2021 04:15 )    Color: Yellow / Appearance: Clear / S.025 / pH: x  Gluc: x / Ketone: NEGATIVE  / Bili: Negative / Urobili: 0.2 E.U./dL   Blood: x / Protein: 100 mg/dL / Nitrite: NEGATIVE   Leuk Esterase: NEGATIVE / RBC: < 5 /HPF / WBC 5-10 /HPF   Sq Epi: x / Non Sq Epi: 0-5 /HPF / Bacteria: Present /HPF      CAPILLARY BLOOD GLUCOSE      POCT Blood Glucose.: 155 mg/dL (2021 06:01)      RADIOLOGY & ADDITIONAL TESTS: Reviewed.    PLAN:

## 2021-04-23 NOTE — DISCHARGE NOTE NURSING/CASE MANAGEMENT/SOCIAL WORK - PATIENT PORTAL LINK FT
You can access the FollowMyHealth Patient Portal offered by [facility] by registering at the following website: [email or web address]. By joining [facility]’s FollowMyHealth portal, you will also be able to view your health information using other applications (apps) compatible with our system.

## 2021-04-23 NOTE — DISCHARGE NOTE NURSING/CASE MANAGEMENT/SOCIAL WORK - NSTRANSFERBELONGINGSRESP_GEN_A_NUR
Chief Complaint   Patient presents with   • Follow-up     hypertension   • MEDICATION ISSUE     stopped taking the losartan in February as she did not feel well on it- got headaches, felt fuzzy, like her BP was too low, possibly made her anxiety worse       SUBJECTIVE:  Milagro Cobb is a 30 year old female presenting for follow-up of hypertension. She does state that she stopped her losartan in February because she did not feel well on it. I did discuss with patient that when she does this with her medication she must tell us right away. She does have some blood pressure readings were in the past 2 days and she is still running in the 140s over 80s and 90s. She thinks that this is adequate however I did discuss with her the importance of trying to get her blood pressures down towards the 120/70 and she was closer to this goal in before February before she stopped her losartan. I did discuss with her that it may have just been the type of blood pressure medication that she was on that was causing her her symptoms and we should explore other options. She currently denies any lightheadedness, dizziness, visual disturbances, shortness of breath or chest pain. She does continue to smoke and we did discuss smoking cessation as well as low-sodium diet. She also complains of left wrist pain states that some time ago she was mowing the grass with a push mower while sitting in her scooter and was pushing the mower with her left hand and as she was pushing the mower got hung up and her wrist buckled. She states that she has mowed the lawn since then but she continues to have discomfort into the wrist area.   PAST MEDICAL HISTORY:    Spastic paresis                                                 Comment: inherited    Tachycardia                                                     Comment: pregnancy-induced    Asthma                                                        Lumbar facet joint pain                                        Wheelchair bound                                              CP (cerebral palsy) (CMS/HCC)                                 PONV (postoperative nausea and vomiting)                      Abnormal uterine bleeding                                     Abdominal pain                                                Urinary tract infection                                       Essential (primary) hypertension                              GERD (gastroesophageal reflux disease)                        Patient Active Problem List   Diagnosis   • Spastic paresis   • Lumbar facet joint pain   • Wheelchair bound   • CP (cerebral palsy) (CMS/HCC)   • Lateral meniscus tear   • Fracture, tibial plateau   • S/P arthroscopy of right knee   • At high risk for altered skin integrity   • Tobacco use   • Family history of ischemic heart disease   • Irregular uterine bleeding, pt unable to tolerate hormonal regimens   • Spondylosis of lumbar region without myelopathy or radiculopathy   • Essential hypertension   • Anxiety       Past Surgical History:   Procedure Laterality Date   • Baclofen pump implantation     • Bone tumor excision      LLL, benign   •  section, classic     • Esophagogastroduodenoscopy  2/25/15   • Esophagogastroduodenoscopy  2016   • Esophagogastroduodenoscopy  01/10/2018   • Esophagogastroduodenoscopy transoral flex w/bx single or mult  2010    EGD with Bx   • Intrathecal pump implantation  2013    replaced pain pump   • Knee surgery  14    Right Knee Arthroscopy, Partial Lateral Menisectomy. Dr. Santillan @  [facility]   • [place] tooth extraction         Current Outpatient Prescriptions   Medication Sig Dispense Refill   • metoPROLOL tartrate (LOPRESSOR) 25 MG tablet Take one tablet by mouth twice a day. 60 tablet 11   • pantoprazole (PROTONIX) 40 MG tablet TAKE 1 TABLET BY MOUTH TWICE DAILY BEFORE MEALS IN PLACE OF OMEPRAZOLE 60 tablet 5   • albuterol 108 (90 Base) MCG/ACT inhaler  Inhale 2 puffs into the lungs every 4 hours as needed for Shortness of Breath or Wheezing. 1 Inhaler 5   • baclofen (LIORESAL) 10 MG tablet Take 10 mg by mouth as needed.     • baclofen 110 mcg/day by Intrathecal route continuous.     • amLODIPine (NORVASC) 2.5 MG tablet Take 1 tablet by mouth daily. 30 tablet 11   • nicotine (NICODERM) 7 MG/24HR patch Place 1 patch onto the skin every 24 hours. Dx:  F17.200. 28 patch 0   • ketorolac (TORADOL) 10 MG tablet Take 2 tablets by mouth every 12 hours as needed for Pain (as needed for headache). 12 tablet 0     No current facility-administered medications for this visit.        ALLERGIES:   Allergen Reactions   • Bee Venom SWELLING     Local swelling as a child   • Imitrex [Sumatriptan Base] SWELLING     Pressure on jaw.   • Buspar [Buspirone] Other (See Comments)     Muscle pain in the legs   • Eggs Or Egg-Derived Products DIARRHEA     Abdominal pain and cramps   • Lexapro [Escitalopram] MYALGIA       Social History     Social History   • Marital status:      Spouse name: N/A   • Number of children: N/A   • Years of education: N/A     Social History Main Topics   • Smoking status: Current Every Day Smoker     Packs/day: 0.50     Years: 7.00     Types: Cigarettes   • Smokeless tobacco: Never Used      Comment: has the patches at home   • Alcohol use 0.0 - 1.2 oz/week      Comment: Rarely   • Drug use: No      Comment: Coffee 1-2 cups in the AM   • Sexual activity: Not Asked     Other Topics Concern   • None     Social History Narrative   • None        Family History   Problem Relation Age of Onset   • Adopted: Yes   • Cancer Father         Tongue cancer   • Myocardial Infarction Maternal Grandfather    • Heart disease Maternal Grandfather    • * Daughter         Cerebral Palsy   • Cancer Mother    • Myocardial Infarction Mother    • Aneurysm Maternal Grandmother         OBJECTIVE:  Vital Signs:   Visit Vitals  BP (!) 166/112   Pulse 72   Temp 97.7 °F (36.5 °C)  (Temporal Artery)   Resp 20   Ht 5' 2\" (1.575 m)   SpO2 99%     General: No acute distress noted.  HEENT: Conjunctivae are neither pale nor injected. Mucous membranes are moist. Nares are clear. Oropharynx is moist without erythema, exudate, or petechiae.  Neck: Supple without anterior cervical or supraclavicular adenopathy.  Lungs: Clear to auscultation bilaterally without crackles or wheezes. Respirations are even and unlabored.  Heart: Regular rate and rhythm without murmurs, rubs, or gallops.  Skin: Warm and dry without rashes.  Extremities: No cyanosis, edema. Patient does have full range of motion to the left wrist area but does have tenderness to the distal wrist region no crepitus clicking or popping is noted. Suspect sprain  Neurologic: Alert and oriented times 3.   Due for lab testing  ASSESSMENT AND PLAN:    Milagro was seen today for follow-up and medication issue.    Diagnoses and all orders for this visit:    Essential hypertension    Elevated blood pressure reading    Left wrist sprain, initial encounter  -     XR WRIST 3+ VW LEFT; Future    Tobacco use    Other orders  -     amLODIPine (NORVASC) 2.5 MG tablet; Take 1 tablet by mouth daily.  -     metoPROLOL tartrate (LOPRESSOR) 25 MG tablet; Take one tablet by mouth twice a day.       Discuss at length her blood pressure and I've recommended that we start her on the above prescribed medication. We will initiate amlodipine continue on metoprolol. She will be checking blood pressures at various times of the day smoking cessation is advised. Recommend x-ray of the left wrist area. She is asking about vitamin D3 and calcium supplement which I've encouraged her to take. She will follow-up in 6 weeks for her blood pressure. All her questions are answered. We will call x-ray once it is return.   yes

## 2021-04-23 NOTE — DISCHARGE NOTE PROVIDER - NSDCMRMEDTOKEN_GEN_ALL_CORE_FT
aspirin 81 mg oral delayed release tablet: 1 tab(s) orally once a day  carvedilol 12.5 mg oral tablet: 1 tab(s) orally 2 times a day   Crestor 40 mg oral tablet: 1 tab(s) orally once a day  Effient 10 mg oral tablet: 1 tab(s) orally once a day  furosemide 80 mg oral tablet: 1 tab(s) orally once a day  HumaLOG KwikPen 100 units/mL injectable solution: 15 unit(s) injectable 3 times a day  hydrALAZINE 25 mg oral tablet: 1 tab(s) orally 4 times a day  isosorbide mononitrate 60 mg oral tablet, extended release: 1 tab(s) orally once a day (in the morning)  Lantus Solostar Pen 100 units/mL subcutaneous solution: 80 unit(s) subcutaneous once a day (at bedtime)  Neurontin 600 mg oral tablet: 1  orally 2 times a day  omeprazole 20 mg oral delayed release capsule: 2 cap(s) orally once a day   aspirin 81 mg oral delayed release tablet: 1 tab(s) orally once a day  carvedilol 12.5 mg oral tablet: 1 tab(s) orally 2 times a day   Crestor 40 mg oral tablet: 1 tab(s) orally once a day  Effient 10 mg oral tablet: 1 tab(s) orally once a day  furosemide 80 mg oral tablet: 1 tab(s) orally once a day  HumaLOG KwikPen 100 units/mL injectable solution: 15 unit(s) injectable 3 times a day  hydrALAZINE 25 mg oral tablet: 1 tab(s) orally 3 times a day  isosorbide mononitrate 60 mg oral tablet, extended release: 1 tab(s) orally once a day (in the morning)  Lantus Solostar Pen 100 units/mL subcutaneous solution: 80 unit(s) subcutaneous once a day (at bedtime)  Neurontin 600 mg oral tablet: 1  orally 2 times a day  omeprazole 20 mg oral delayed release capsule: 2 cap(s) orally once a day  sevelamer carbonate 800 mg oral tablet: 1 tab(s) orally 3 times a day (with meals)

## 2021-04-23 NOTE — DIETITIAN INITIAL EVALUATION ADULT. - OTHER INFO
51M w/ ESRD (HD M,W,F), DM2 (A1C 7.9) CAD (s/p CABGx4, stents), legally blind, HTN who p/w SOB i/s/o missed dialysis, admitted for urgent HD and management of respiratory failure s/p intubation 2/2 to acute of chronic decompensated heart failure, c/b by NSTEMI. On contact precautions for possible COVID exposure from travel. Intubated 4/20, extubated 4/22. Receiving dialysis at time of assessment. No noted n/v/d/c, chewing/ swallowing issues or pain impacting intake, skin is intact, sulma 14, abdomen soft/NT, distended. Unsure of dry wt. Good appetite typically at home, NKFA. Feels better after HD initiation. Discussed current diet restrictions with pt, receptive. Continues to be managed/ monitored in CCU at this time. Will continue to follow per protocol.

## 2021-04-23 NOTE — PROGRESS NOTE ADULT - SUBJECTIVE AND OBJECTIVE BOX
Patient is a 51y Male seen and evaluated at bedside. Plan for IHD today. Fevers of unknown origin- team concerned about thrombosis in AVF given prior hx. Access venous and arterial pressures have been normal- lessens likelihood, will monitor pressures during HD today. F/u ID.    Meds:    acetaminophen   Tablet .. 650 every 6 hours PRN  aspirin  chewable 81 daily  atorvastatin 80 every 24 hours  carvedilol 6.25 every 12 hours  chlorhexidine 2% Cloths 1 <User Schedule>  dextrose 40% Gel 15 once  dextrose 5%. 1000 <Continuous>  dextrose 5%. 1000 <Continuous>  dextrose 50% Injectable 25 once  dextrose 50% Injectable 12.5 once  dextrose 50% Injectable 25 once  glucagon  Injectable 1 once  heparin   Injectable 5000 every 8 hours  hydrALAZINE 25 three times a day  insulin glargine Injectable (LANTUS) 25 at bedtime  insulin lispro Injectable (ADMELOG) 5 three times a day before meals  insulin regular  human corrective regimen sliding scale  every 6 hours  iron sucrose IVPB 200 once  pantoprazole    Tablet 40 before breakfast  prasugrel 10 daily  sevelamer carbonate 800 three times a day with meals      T(C): , Max: 38.1 (21 @ 19:00)  T(F): , Max: 100.6 (21 @ 19:00)  HR: 83 (21 @ 08:00)  BP: 155/71 (21 @ 00:00)  BP(mean): 102 (21 @ 00:00)  RR: 11 (21 @ 08:00)  SpO2: 94% (21 @ 08:00)  Wt(kg): --     @ 07:01  -   @ 07:00  --------------------------------------------------------  IN: 594 mL / OUT: 1100 mL / NET: -506 mL    Review of Systems:  RESPIRATORY: No shortness of breath  CARDIOVASCULAR: No chest pain  MUSCULOSKELETAL: No leg oedema    PHYSICAL EXAM:  GENERAL: well-developed, well nourished, alert, no acute distress at present on RA  CHEST/LUNG: Reduced breath sounds on Right base  HEART: Regular rate and rhythm, no murmur, s/p sternotomy, Right subQ chest wall device  ABDOMEN: Soft, nontender, non distended, s/p surgical incision  EXTREMITIES: trace oedema, Left great toe amputation  ACCESS: RUE AVF w/bruit and thrill     LABS:                        10.4   8.84  )-----------( 133      ( 2021 05:40 )             32.0         135  |  92<L>  |  65<H>  ----------------------------<  165<H>  3.8   |  22  |  7.68<H>    Ca    8.5      2021 05:40  Phos  8.1       Mg     2.3         TPro  7.5  /  Alb  3.8  /  TBili  0.4  /  DBili  x   /  AST  19  /  ALT  9<L>  /  AlkPhos  79  23      PT/INR - ( 2021 05:40 )   PT: 13.9 sec;   INR: 1.17          PTT - ( 2021 05:40 )  PTT:29.4 sec  Urinalysis Basic - ( 2021 04:15 )    Color: Yellow / Appearance: Clear / S.025 / pH: x  Gluc: x / Ketone: NEGATIVE  / Bili: Negative / Urobili: 0.2 E.U./dL   Blood: x / Protein: 100 mg/dL / Nitrite: NEGATIVE   Leuk Esterase: NEGATIVE / RBC: < 5 /HPF / WBC 5-10 /HPF   Sq Epi: x / Non Sq Epi: 0-5 /HPF / Bacteria: Present /HPF            RADIOLOGY & ADDITIONAL STUDIES:

## 2021-04-23 NOTE — PROGRESS NOTE ADULT - ASSESSMENT
52 yo M w/ ESRD (HD MWF), CAD (s/p CABGx4, stents), HTN, pacemaker who p/w SOB i/s/o missed dialysis, admitted for urgent HD and management of respiratory failure s/p intubation 2/2 to acute of chronic decompensated heart failure, c/b by NSTEMI.       NEURO  #Sedation  - propofol held this AM  - c/w Precedex  -TG today 255      CARDIOVASCULAR  # NSTEMI  - Cardiac enzyme plateau  - On admission EKG w/ LBBB, ST depressions in I, II, III, aVF, V5, V6, Today ECG Intraventricular conduction delay   - c/w ASA 81  -Switched Plavix 75 to home med Effient 60mg and tomorrow 10mg daily  -Hep gtt dc'd    #CAD  - s/p CABG, stents x [id number]  - c/w ASA 81  - Loaded Effient 60mg  -c/w Effient 10mg daily start 4/23    #HTN  - home meds amlodipine, metoprolol, prn hydralazine after HD  -c/w Coreg 6.25 BID       #R/o Heart failure  -TTE 4/22: Basal and mid inferior septum and basal and mid inferior wall are abnormal. Moderately reduced left ventricular systolic function with and ejection fraction of 35-40%. Regional wall motion abnormalities consistent with ischemic heart disease. Normal right ventricular size and systolic function. Normal atria. Moderate aortic stenosis. No pericardial effusion.    PULM  #Acute hypoxic and hypercapnic respiratory failure  - Intubated on 4/20 ane extubated 4/22  -likely 2/2 fluid overload i/s/o missed HD 4/19; CXR s/f pleural effusions vs Acute on chronic CHF exacerbation   - S/p  emergent HD 4/20 removal 4L   -Plan for HD on 4/21 removal 4L  -CXR plural effusion improved after 2x HD   -Plan for HD tomorrow     RENAL  #ESRD  - HD M/W/F, missed HD before coming to the [facility]   - BUN/Cr 68/7.7 on admission   - renal consulted; recs appreciated  - HD 4/20 removed 4 L and will get HD 4/21 one more session      #Volume status  - CXR w/ pleural effusion i/s/o of missed HD, fluid overload  - strict I&Os  - get repeat CXR post HD    #Mixed Metabolic & Respiratory Acidosis   Improved     #Electrolyte abnormalities  -Improved     ENDOCRINE  #Glucose control  -DMT2 on Home insulin   - currently on low dose sliding scale q6h  - HbA1c 7.9  - monitor FSG daily       HEME  #Anemia  - Hb 12.5 on admission, MCV 88.8  - no signs of active bleeding  - most likely 2/2 ESRD    #Leukocytosis  -Improved     ID  #Severe Sepsis  - Improved  - Procalcitonin 0.20  - COVID neg x2, COVID ab +, COVID Neucleocapsid negative, per pt's sister, pt got vaccine 2 weeks ago , Per ID clear to transfer to non COVID unit   - UA negative for UTI; no clear source  - Ceftriaxone  held  - New Tmax 101.5 rectal, No source of urinary, lung, blood, wound  infection. AV fistula clean, nontender non infected  - ID consulted for new Fever       Fluids: No IVF currently needed  Electrolytes: Replete w/ caution (ESRD)  Nutrition: NPO except meds, NGT, Diet pending S& S    Prophylaxis: Heparin gtt held, heparin SQ for PPx   GI: Protonix 40mg PO   Dispo: CCU       52 yo M w/ ESRD (HD MWF), CAD (s/p CABGx4, stents), HTN, pacemaker who p/w SOB i/s/o missed dialysis, admitted for urgent HD and management of respiratory failure s/p intubation 2/2 to acute of chronic decompensated heart failure, c/b by NSTEMI.       NEURO  AAOx3       CARDIOVASCULAR  # NSTEMI  - Cardiac enzyme plateau  - On admission EKG w/ LBBB, ST depressions in I, II, III, aVF, V5, V6, Today ECG Intraventricular conduction delay   - c/w ASA 81  - Effient 10mg daily    #CAD  - s/p CABG, stents x [id number]  - c/w ASA 81  -c/w Effient 10mg daily start 4/23    #HTN  - home meds amlodipine, metoprolol, prn hydralazine after HD  -c/w Coreg 12.5 BID       #R/o Heart failure  -TTE 4/22: Basal and mid inferior septum and basal and mid inferior wall are abnormal. Moderately reduced left ventricular systolic function with and ejection fraction of 35-40%. Regional wall motion abnormalities consistent with ischemic heart disease. Normal right ventricular size and systolic function. Normal atria. Moderate aortic stenosis. No pericardial effusion.    PULM  #Acute hypoxic and hypercapnic respiratory failure  - Intubated on 4/20 ane extubated 4/22  -likely 2/2 fluid overload i/s/o missed HD 4/19; CXR s/f pleural effusions vs Acute on chronic CHF exacerbation   - S/p  emergent HD 4/20 removal 4L   -Plan for HD on 4/21 removal 4L  -CXR plural effusion improved after 2x HD   -Plan for HD tomorrow     RENAL  #ESRD  - HD M/W/F, missed HD before coming to the [facility]   - BUN/Cr 68/7.7 on admission   - renal consulted; recs appreciated  - HD 4/20 removed 4 L and will get HD 4/21 one more session      #Volume status  - CXR w/ pleural effusion i/s/o of missed HD, fluid overload  - strict I&Os  - get repeat CXR post HD    #Mixed Metabolic & Respiratory Acidosis   Improved     #Electrolyte abnormalities  -Improved     ENDOCRINE  #Glucose control  -DMT2 on Home insulin   - currently on low dose sliding scale q6h  - HbA1c 7.9  - monitor FSG daily       HEME  #Anemia  - Hb 12.5 on admission, MCV 88.8  - no signs of active bleeding  - most likely 2/2 ESRD    #Leukocytosis  -Improved     ID  #Severe Sepsis  - Improved  - Procalcitonin 0.20  - COVID neg x2, COVID ab +, COVID Neucleocapsid negative, per pt's sister, pt got vaccine 2 weeks ago , Per ID clear to transfer to non COVID unit   - UA negative for UTI; no clear source  - All fever work up negative       Fluids: No IVF currently needed  Electrolytes: Replete w/ caution (ESRD)  Nutrition: DAH/TLC   Prophylaxis:heparin SQ for PPx   GI: Protonix 40mg PO   Dispo: CCU

## 2021-04-23 NOTE — PROGRESS NOTE ADULT - ATTENDING COMMENTS
course of fluid overload, ischemia, and resp failure reviewed, and strategy for today's dialysis reviewed in detail with dr macias.  agree with findings and plan.
have reviewed progress and examined pt--- pt visibly more comfortable today, in nad, and lungs are clear to PAL.  will plan for hd tomorrow.   have reviewed above data, and agree with findings and plans.
have reviewed status and discussed with this pt who arrived with marked fluid overload, required intubation, has improved after serial dialyses, and today exhibits a remarkable resurgence-- bright, energetic, comfortable, talkaltive, no complaints.   no resp distress, no edema, excited about prospect of imminent discharge.   pt has cardiologist who has been evaluating his coronary status, and he wishes to return home for cont'd f/u.    agree with findings and plans.
51M, obese, CAD w/ h/o CABG/PCI, HFrEF 2/2 Ischemic CM, HTN, HLD, ESRD on HD, DM s/p toe amputations p/w acute on chronic sCHF exacerbation after missed HD and possible NSTEMI, admitted to CCU    -sCHF - acute on chronic sCHF exacerbation 2/2 missed HD c/b acute hypoxemic respiratory failure requiring intubation - pt. is now s/p HD x 2 (8L removed), now s/p extubation today; Start Coreg 6.25 BID, will restart home Hydralazine 25 PO TID and likely start Imdur 60 PO tmrw; Volume managed via HD, plan for next session likely tmrw  -CAD - Extensive CAD hx w/ CABG/PCI in the past - current presentation consistent w/ NSTEMI vs Type II MI in the setting of sCHF/Volume overload. Troponin pattern indicates more likely NSTEMI. Trops have peaked and downtrended. Pt. is currently CP free, HD/E stable, evidence of AIVR today on tele indicating reperfusion. s/p Heparin gtt x 48hrs, c/w DAPT, will switch Plavix back to home Effient, will need re-load w/ Effient 60 PO x 1, followed by 10 daily, c/w Statin. Pt. does not wish to pursue Cardiac cath on this admission unless absolutely needed(currently traveling from [place]), f/u official TTE   -Renal - ESRD on HD; s/p HD x 2 w/ 8L removed after missed HD; Improved volume status, now extubated. Renal following, likely HD tmrw; Will need to restart Renal meds including phosphate binders  -DASH/Renal diet pending Swallow eval  -DVT PPx  -Dispo: CCU  -Full Code    Crystal Fam MD  CCU Attending
agree with assessment and plan as documented above  acute respiratory failure  NSTEMI  ESRD on HD    - asa, plavix and heparin ggt for NSTEMI  - continue ceftiraxone for now. would dc if blood cultures negative after 48 hours.   - transfer to CCU, d/w with Cardiology

## 2021-04-23 NOTE — DISCHARGE NOTE PROVIDER - NSDCCPTREATMENT_GEN_ALL_CORE_FT
PRINCIPAL PROCEDURE  Procedure: Transthoracic echocardiogram  Findings and Treatment: Date 4/22/2021:   1. Basal and mid inferior septum and basal and mid inferior wall are abnormal.   2. Moderately reduced left ventricular systolic function with and ejection fraction of 35-40%.   3. Regional wall motion abnormalities consistent with ischemic heart disease.   4. Normal right ventricular size and systolic function.   5. Normal atria.   6. Moderate aortic stenosis.   7. No pericardial effusion.

## 2021-04-23 NOTE — DISCHARGE NOTE PROVIDER - NSDCFUADDAPPT_GEN_ALL_CORE_FT
Please follow up with the dialysis (HD) center at [address] on Monday at 3 PM.     Address: [facility] [address] [place], [place], [facility]  Phone: [phone]     Please follow up with the dialysis (HD) center Monday at 3 PM.     Address: [facility]   [address] , [place] [address] [phone]

## 2021-04-23 NOTE — PROGRESS NOTE ADULT - ASSESSMENT
52 yo M w/ ESRD (HD MWF), CAD (s/p CABGx4, stents), HTN who p/w hypertensive emergency with fluid overload s/p intubation for hypoxic respiratory failure after missing dialysis yesterday. Renal consulted.    ESRD on HD MWF  Usual tx 210min 3-4L    Fevers of unknown origin-doubt AVF thrombosis given normal access pressures, okay with dopplers to r/o. F/u ID.  HD today    Dialyzer: Optiflux [id number]         QB: 400 mL/min         QD: 500 mL/min      K bath: 3  Goal UF: 3L                Duration: 210 min     BP: UF with HD, coreg 3.25q12h  Anaemia- IV iron with HD- cx negative, no epo given MI w/u  BMD: renvela 800 TID, start vit D 1000 IU daily    Daily weights, strict I&Os, renally dose meds

## 2021-04-23 NOTE — DISCHARGE NOTE PROVIDER - HOSPITAL COURSE
Hospital Course:  Mr. Grigsby is a 50 yo M w/ ESRD (HD MWF), CAD (s/p CABGx4, stents), HTN, defibrillator (unclear year of plct), recent travel from [place] to [facility] to visit sister, p/w SOB i/s/o missed dialysis, with numerous electrolyte abnormalities (s/p hyperkalemic cocktail), initially admitted for urgent HD and management of respiratory failure (presented meeting SIRS criteria thought to be septic and hypertensive urgency s/p initiation of abx and antihypertensives; was tachyneic to 30s even on bipap with increased WOB, s/p intubation 4/19 for acute respiratory hypercapnic failure . S/p urgent dialysis (s/p 8L removal session finishing 4/21), and patient was admitted to COVID MICU (initial concern for risk factors given some labs eg lymphopenia and pt's travel hx- came from [place] to visit sister), later s/p negative COVID (-) x2 and negative COVID nucleocapsid. All Transferred to CCU for serial monitoring of CE's and EKG c/f new/worsening ischemic changes, with c/f NSTEMI. Symptoms consistent with pulmonary effusion, lung over fluid likely due to missed Dialysis vs acute on chronic CHF. Extubated on 4/21 and lung improved. On CPAP overnight for Sleep apnea. S/p 3 session HD. He had spikes of fever and all fever work up negative. Based on the medical record was received from hi PCP from [place], he had right arm pain one month ago and he did all the vascular work up with vascular surgeon in [place] which showed no trombosis in Right Arm AVF. also AVF worked for getting dialysis during HD and no concern for clot /trombois in AVF.  He is stable to discharge home. Dialysis center has been scheduled in [place] until he is in [place] for one week . His next HD in [place] is set up for next Saturday in his HD center.     Problem/Plan:    CARDIOVASCULAR  # NSTEMI  - ECG Intraventricular conduction delay   - c/w ASA 81 and Effient 10mg     #CAD  - s/p CABG, stents x [id number]  - c/w ASA 81, Plavix 75mg  switched to home mediction Effient 10mg    #HTN  - home meds amlodipine, metoprolol, prn hydralazine after HD  -Metoprolol titrate stopped  - Tolerated Coreg well inpatient.   -Will discharge on Coreg 12.5 BID      #R/o Heart failure  -TTE 4/22: Basal and mid inferior septum and basal and mid inferior wall are abnormal. Moderately reduced left ventricular systolic function with and ejection fraction of 35-40%. Regional wall motion abnormalities consistent with ischemic heart disease. Normal right ventricular size and systolic function. Normal atria. Moderate aortic stenosis. No pericardial effusion.    PULM  #Acute hypoxic and hypercapnic respiratory failure  - Intubated on 4/20 ane extubated 4/22  -likely 2/2 fluid overload i/s/o missed HD; CXR s/f pleural effusions vs Acute on chronic CHF exacerbation   - S/p  emergent HD in the ED   -S/p HD x 3   -CXR plural effusion improved after HD       RENAL  #ESRD  -Right arm AVF, he reported pain and numbness in right arm one month ago. Document from ER physician showed some concern about kathleen syndrome vs trombosis. All the work up on March 22ns at [facility] showed no concern for trombosis or steel syndrome.   -On HD M/W/F    #Mixed Metabolic & Respiratory Acidosis   Improved     #Electrolyte abnormalities  -Improved     ENDOCRINE  -DMT2 on Home insulin   -On Insulin at home   - HbA1c 7.9    #Leukocytosis  -Improved     ID  #Severe Sepsis on arrival, Resolved   - Procalcitonin 0.20  - COVID neg x2, COVID ab +, COVID Neucleocapsid negative, per pt's sister, pt got vaccine 2 weeks ago , Per ID clear to transfer to non COVID unit   - UA negative for UTI; no clear source  - Antibiotics held   - New Tmax 101.5 rectal, No source of urinary, lung, blood, wound  infection.   -AV fistula clean, no concern for trombosis, nontender non infected, HD done easily with no increase of AVF pressure.  -Fever self resolved   -All fever work up negative       New medication after discharge: Hold on metoprolol titrate and start Coreg 12.5 BID   Exam after discharge: Nephrology, Cardiology,   Labs after dicharge: CBC, CMP Hospital Course:  Mr. Grigsby is a 52 yo M w/ ESRD (HD MWF), CAD (s/p CABGx4, stents), HTN, defibrillator (unclear year of plct), recent travel from [place] to [facility] to visit sister, p/w SOB i/s/o missed dialysis, with numerous electrolyte abnormalities (s/p hyperkalemic cocktail), initially admitted for urgent HD and management of respiratory failure (presented meeting SIRS criteria thought to be septic and hypertensive urgency s/p initiation of abx and antihypertensives; was tachyneic to 30s even on bipap with increased WOB, s/p intubation 4/19 for acute respiratory hypercapnic failure . S/p urgent dialysis (s/p 8L removal session finishing 4/21), and patient was admitted to COVID MICU (initial concern for risk factors given some labs eg lymphopenia and pt's travel hx- came from [place] to visit sister), later s/p negative COVID (-) x2 and negative COVID nucleocapsid. All Transferred to CCU for serial monitoring of CE's and EKG c/f new/worsening ischemic changes, with c/f NSTEMI. Symptoms consistent with pulmonary effusion, lung over fluid likely due to missed Dialysis vs acute on chronic CHF. Extubated on 4/21 and lung improved. On CPAP overnight for Sleep apnea. S/p 3 session HD. He had spikes of fever and all fever work up negative. Based on the medical record was received from hi PCP from [place], he had right arm pain one month ago and he did all the vascular work up with vascular surgeon in [place] which showed no trombosis in Right Arm AVF. also AVF worked for getting dialysis during HD and no concern for clot /trombois in AVF.  He is stable to discharge home. Dialysis center schedule in [facility] for next Monday in [facility] . His next HD in [place] is set up for when he will come back in [place] in next 2 weeks.     Problem/Plan:    CARDIOVASCULAR  # NSTEMI  - ECG Intraventricular conduction delay   - c/w ASA 81 and Effient 10mg     #CAD  - s/p CABG, stents x [id number]  - c/w ASA 81, Plavix 75mg  switched to home mediction Effient 10mg    #HTN  - home meds amlodipine, metoprolol, prn hydralazine after HD  -Metoprolol titrate stopped  - Tolerated Coreg well inpatient.   -Will discharge on Coreg 12.5 BID      #R/o Heart failure  -TTE 4/22: Basal and mid inferior septum and basal and mid inferior wall are abnormal. Moderately reduced left ventricular systolic function with and ejection fraction of 35-40%. Regional wall motion abnormalities consistent with ischemic heart disease. Normal right ventricular size and systolic function. Normal atria. Moderate aortic stenosis. No pericardial effusion.    PULM  #Acute hypoxic and hypercapnic respiratory failure  - Intubated on 4/20 ane extubated 4/22  -likely 2/2 fluid overload i/s/o missed HD; CXR s/f pleural effusions vs Acute on chronic CHF exacerbation   - S/p  emergent HD in the ED   -S/p HD x 3   -CXR plural effusion improved after HD       RENAL  #ESRD  -Right arm AVF, he reported pain and numbness in right arm one month ago. Document from ER physician showed some concern about kathleen syndrome vs trombosis. All the work up on March 22ns at [facility] showed no concern for trombosis or steel syndrome.   -On HD M/W/F    #Mixed Metabolic & Respiratory Acidosis   Improved     #Electrolyte abnormalities  -Improved     ENDOCRINE  -DMT2 on Home insulin   -On Insulin at home   - HbA1c 7.9    #Leukocytosis  -Improved     ID  #Severe Sepsis on arrival, Resolved   - Procalcitonin 0.20  - COVID neg x2, COVID ab +, COVID Neucleocapsid negative, per pt's sister, pt got vaccine 2 weeks ago , Per ID clear to transfer to non COVID unit   - UA negative for UTI; no clear source  - Antibiotics held   - New Tmax 101.5 rectal, No source of urinary, lung, blood, wound  infection.   -AV fistula clean, no concern for trombosis, nontender non infected, HD done easily with no increase of AVF pressure.  -Fever self resolved   -All fever work up negative       New medication after discharge: Hold on metoprolol titrate and start Coreg 12.5 BID   Exam after discharge: Nephrology, Cardiology,   Labs after dicharge: CBC, CMP Hospital Course:  Mr. Grigsby is a 52 yo M w/ ESRD (HD MWF), CAD (s/p CABGx4, stents), HTN, defibrillator (unclear year of plct), [facility] [place] [facility], p/w SOB i/s/o missed dialysis, with numerous electrolyte abnormalities (s/p hyperkalemic cocktail), initially admitted for urgent HD and management of respiratory failure (presented meeting SIRS criteria thought to be septic and hypertensive urgency s/p initiation of abx and antihypertensives; was tachyneic to 30s even on bipap with increased WOB, s/p intubation 4/19 for acute respiratory hypercapnic failure . S/p urgent dialysis (s/p 8L removal session finishing 4/21), and patient was admitted to COVID MICU (initial concern for risk factors given some labs eg lymphopenia and pt's travel hx- came from [place] to visit sister), later s/p negative COVID (-) x2 and negative COVID nucleocapsid. he also recived COVID vaccine maderna 2 weeks ago in [place]. he was transferred from MICU to CCU for serial monitoring of CE's and EKG c/f new/worsening ischemic changes, with c/f NSTEMI. All cadiac enzymes elevated. Imaging and labs consistent with fluid overload in lung likely due to missed Dialysis vs acute on chronic CHF.  plural effusion improved after 2x HD . Extubated on 4/21 and lung improved.  S/p 3 session HD.He had spikes of fever and all fever work up negative. Based on the medical record was received from hi PCP from [place], he had right arm pain one month ago and he did all the vascular work up with vascular surgeon in [place] which showed no trombosis in Right Arm AVF. also AVF worked for getting dialysis during HD and no concern for clot /trombois in AVF.  He is stable to discharge home. [facility] for [facility] Monday in [facility] . His next HD in [place] is set up for when he will come back in [place] in next 2 weeks.     Problem/Plan:    CARDIOVASCULAR  # NSTEMI  - ECG Intraventricular conduction delay   - c/w ASA 81 and Effient 10mg     #CAD  - s/p CABG, stents x [id number]  - c/w ASA 81, Plavix 75mg  switched to home mediction Effient 10mg    #HTN  - home meds amlodipine, metoprolol, prn hydralazine after HD  -Metoprolol titrate stopped  - Tolerated Coreg well inpatient.   -Will discharge on Coreg 12.5 BID      #R/o Heart failure  -TTE 4/22: Basal and mid inferior septum and basal and mid inferior wall are abnormal. Moderately reduced left ventricular systolic function with and ejection fraction of 35-40%. Regional wall motion abnormalities consistent with ischemic heart disease. Normal right ventricular size and systolic function. Normal atria. Moderate aortic stenosis. No pericardial effusion.    PULM  #Acute hypoxic and hypercapnic respiratory failure  - Intubated on 4/20 ane extubated 4/22  -likely 2/2 fluid overload i/s/o missed HD; CXR s/f pleural effusions vs Acute on chronic CHF exacerbation   - S/p  emergent HD in the ED   -S/p HD x 3   -CXR plural effusion improved after HD   - Use home CPAP overnight for Sleep apnea      RENAL  #ESRD  -Right arm AVF, he reported pain and numbness in right arm one month ago. Document from ER physician showed some concern about kathleen syndrome vs trombosis. All the work up on March 22ns at [facility] showed no concern for trombosis or steel syndrome.   -On HD M/W/F    #Mixed Metabolic & Respiratory Acidosis   Improved     #Electrolyte abnormalities  -Improved     ENDOCRINE  -DMT2 on Home insulin   -On Insulin at home   - HbA1c 7.9    #Leukocytosis  -Improved     ID  #Severe Sepsis on arrival, Resolved   - Procalcitonin 0.20  - COVID neg x2, COVID ab +, COVID Neucleocapsid negative, per pt's sister, pt got vaccine 2 weeks ago , Per ID clear to transfer to non COVID unit   - UA negative for UTI; no clear source  - Antibiotics held   - New Tmax 101.5 rectal, No source of urinary, lung, blood, wound  infection.   -AV fistula clean, no concern for trombosis, nontender non infected, HD done easily with no increase of AVF pressure.  -Fever self resolved   -All fever work up negative       New medication after discharge: Hold on metoprolol titrate and start Coreg 12.5 BID   Exam after discharge: Nephrology, Cardiology,   Labs after dicharge: CBC, CMP Hospital Course:  Mr. Grigsby is a 50 yo M w/ ESRD (HD MWF), CAD (s/p CABGx4, stents), HTN, defibrillator (unclear year of plct), recent travel from [place] to [facility] to visit sister, p/w SOB i/s/o missed dialysis, with numerous electrolyte abnormalities (s/p hyperkalemic cocktail), initially admitted for urgent HD and management of respiratory failure (presented meeting SIRS criteria thought to be septic and hypertensive urgency s/p initiation of abx and antihypertensives; was tachyneic to 30s even on bipap with increased WOB, s/p intubation 4/19 for acute respiratory hypercapnic failure . S/p urgent dialysis (s/p 8L removal session finishing 4/21), and patient was admitted to COVID MICU (initial concern for risk factors given some labs eg lymphopenia and pt's travel hx- came from [place] to visit sister), later s/p negative COVID (-) x2 and negative COVID nucleocapsid. Collateral obtained that he received COVID vaccine maderna 2 weeks ago in [place]. he was transferred from MICU to CCU for serial monitoring of troponin and EKG c/f new/worsening ischemic changes, with c/f NSTEMI. Imaging and labs consistent with fluid overload in lung likely due to missed Dialysis vs acute on chronic CHF.  plural effusion improved after 2x HD . Extubated on 4/21 and lung improved.  S/p 3 session HD. He had spikes of fever and all fever work up negative. Based on the medical record was received from hi PCP from [place], he had right arm pain one month ago and he did all the vascular work up with vascular surgeon in [place] which showed no trombosis in Right Arm AVF. also AVF worked for getting dialysis during HD and no concern for clot /trombois in AVF.  He is stable to discharge home. Dialysis center schedule in [facility] for next Monday in [facility] . His next HD in [place] is set up for when he will come back in [place] in next 2 weeks.     Problem/Plan:    CARDIOVASCULAR  # NSTEMI  - ECG Intraventricular conduction delay   - c/w ASA 81 and Effient 10mg     #CAD  - s/p CABG, stents x [id number]  - c/w ASA 81, Plavix 75mg  switched to home mediction Effient 10mg    #HTN  - home meds amlodipine, metoprolol, prn hydralazine after HD  -Metoprolol titrate stopped  - Tolerated Coreg well inpatient.   -Will discharge on Coreg 12.5 BID      #R/o Heart failure  -TTE 4/22: Basal and mid inferior septum and basal and mid inferior wall are abnormal. Moderately reduced left ventricular systolic function with and ejection fraction of 35-40%. Regional wall motion abnormalities consistent with ischemic heart disease. Normal right ventricular size and systolic function. Normal atria. Moderate aortic stenosis. No pericardial effusion.    PULM  #Acute hypoxic and hypercapnic respiratory failure  - Intubated on 4/20 ane extubated 4/22  -likely 2/2 fluid overload i/s/o missed HD; CXR s/f pleural effusions vs Acute on chronic CHF exacerbation   - S/p  emergent HD in the ED   -S/p HD x 3   -CXR plural effusion improved after HD   - Use home CPAP overnight for Sleep apnea      RENAL  #ESRD  -Right arm AVF, he reported pain and numbness in right arm one month ago. Document from ER physician showed some concern about kathleen syndrome vs trombosis. All the work up on March 22ns at [facility] showed no concern for trombosis or steel syndrome.   -On HD M/W/F    #Mixed Metabolic & Respiratory Acidosis   Improved     #Electrolyte abnormalities  -Improved     ENDOCRINE  -DMT2 on Home insulin   -On Insulin at home   - HbA1c 7.9    #Leukocytosis  -Improved     ID  #Severe Sepsis on arrival, Resolved   - Procalcitonin 0.20  - COVID neg x2, COVID ab +, COVID Neucleocapsid negative, per pt's sister, pt got vaccine 2 weeks ago , Per ID clear to transfer to non COVID unit   - UA negative for UTI; no clear source  - Antibiotics held   - New Tmax 101.5 rectal, No source of urinary, lung, blood, wound  infection.   -AV fistula clean, no concern for trombosis, nontender non infected, HD done easily with no increase of AVF pressure.  -Fever self resolved   -All fever work up negative       New medication after discharge: Hold on metoprolol titrate and start Coreg 12.5 BID   Exam after discharge: Nephrology, Cardiology,   Labs after dicharge: CBC, CMP Hospital Course:  Mr. Grigsby is a 52 yo M w/ ESRD (HD MWF), CAD (s/p CABGx4, stents), HTN, defibrillator (unclear year of plct), recent travel from [place] to [facility] to visit sister, p/w SOB i/s/o missed dialysis, with numerous electrolyte abnormalities (s/p hyperkalemic cocktail), initially admitted for urgent HD and management of respiratory failure (presented meeting SIRS criteria thought to be septic and hypertensive urgency s/p initiation of abx and antihypertensives; was tachyneic to 30s even on bipap with increased WOB, s/p intubation 4/19 for acute respiratory hypercapnic failure . S/p urgent dialysis (s/p 8L removal session finishing 4/21), and patient was admitted to COVID MICU (initial concern for risk factors given some labs eg lymphopenia and pt's travel hx- came from [place] to visit sister), later s/p negative COVID (-) x2 and negative COVID nucleocapsid. Collateral obtained that he received COVID vaccine maderna 2 weeks ago in [place]. he was transferred from MICU to CCU for serial monitoring of troponin and EKG c/f new/worsening ischemic changes, with c/f NSTEMI. Imaging and labs consistent with fluid overload in lung likely due to missed Dialysis vs acute on chronic CHF.  plural effusion improved after 2x HD . Extubated on 4/21 and lung improved. Troponin peaked and received full 48 hour treatment for NSTEMI w/ heparin ggt. Pt declined cardiac cath eval. Repeat TTE showing baseline EF around 34%, w/ low suspicion for ischemic etiology. he also had fever and all fever work up negative. Based on the medical record was received from hi PCP from [place], he had right arm pain one month ago and he did all the vascular work up with vascular surgeon in [place] which showed no trombosis in Right Arm AVF. also AVF worked for getting dialysis during HD and no concern for clot /trombois in AVF.  He is stable to discharge home. Dialysis center schedule in [facility] for next Monday in [facility] . His next HD in [place] is set up for when he will come back in [place] in next 2 weeks.     Problem/Plan:    CARDIOVASCULAR  # NSTEMI  -troponin peaked  -finished 48 hours of heparin ggt  - c/w ASA 81 and Effient 10mg     #CAD  - s/p CABG, stents x [id number]  - c/w ASA 81, Plavix 75mg  switched to home mediction Effient 10mg    #HTN  - home meds amlodipine, metoprolol, prn hydralazine after HD  -Metoprolol titrate stopped  - Tolerated Coreg well inpatient.   -Will discharge on Coreg 12.5 BID      #R/o Heart failure  -TTE 4/22: Basal and mid inferior septum and basal and mid inferior wall are abnormal. Moderately reduced left ventricular systolic function with and ejection fraction of 35-40%. Regional wall motion abnormalities consistent with ischemic heart disease. Normal right ventricular size and systolic function. Normal atria. Moderate aortic stenosis. No pericardial effusion.    PULM  #Acute hypoxic and hypercapnic respiratory failure  - Intubated on 4/20 ane extubated 4/22  -likely 2/2 fluid overload i/s/o missed HD; CXR s/f pleural effusions vs Acute on chronic CHF exacerbation   - S/p  emergent HD in the ED   -S/p HD x 3   -CXR plural effusion improved after HD   - Use home CPAP overnight for Sleep apnea      RENAL  #ESRD  -Right arm AVF, he reported pain and numbness in right arm one month ago. Document from ER physician showed some concern about kathleen syndrome vs trombosis. All the work up on March 22ns at [place] [facility] [place] showed no concern for trombosis or steel syndrome.   -On HD M/W/F     ENDOCRINE  -DMT2 on Home insulin   -On Insulin at home   - HbA1c 7.9       ID  #Severe Sepsis on arrival, Resolved   - Procalcitonin 0.20  - COVID neg x2, COVID ab +, COVID Neucleocapsid negative, per pt's sister, pt got vaccine 2 weeks ago , Per ID clear to transfer to non COVID unit   - UA negative for UTI; no clear source  - Antibiotics held   - New Tmax 101.5 rectal, No source of urinary, lung, blood, wound  infection.   -AV fistula clean, no concern for trombosis, nontender non infected, HD done easily with no increase of AVF pressure.  -Fever self resolved   -All fever work up negative       New medication after discharge: switch from metoprolol titrate to Coreg 12.5 BID   Exam after discharge: Nephrology and Cardiology in [place] (he is visiting right now)  Labs after dicharge: CBC, CMP Hospital Course:  Mr. Grigsby is a 52 yo M w/ ESRD (HD MWF), CAD (s/p CABGx4, stents), HTN, defibrillator (unclear year of plct), recent travel from [place] to [facility] to visit sister, p/w SOB i/s/o missed dialysis, with numerous electrolyte abnormalities (s/p hyperkalemic cocktail), initially admitted for urgent HD and management of respiratory failure (presented meeting SIRS criteria thought to be septic and hypertensive urgency s/p initiation of abx and antihypertensives; was tachyneic to 30s even on bipap with increased WOB, s/p intubation 4/19 for acute respiratory hypercapnic failure . S/p urgent dialysis (s/p 8L removal session finishing 4/21), and patient was admitted to COVID MICU (initial concern for risk factors given some labs eg lymphopenia and pt's travel hx- came from [place] to visit sister), later s/p negative COVID (-) x2 and negative COVID nucleocapsid. Collateral obtained that he received COVID vaccine maderna 2 weeks ago in [place]. he was transferred from MICU to CCU for serial monitoring of troponin and EKG c/f new/worsening ischemic changes, with c/f NSTEMI. Imaging and labs consistent with fluid overload in lung likely due to missed Dialysis vs acute on chronic CHF.  plural effusion improved after 2x HD . Extubated on 4/21 and lung improved. Troponin peaked and received full 48 hour treatment for NSTEMI w/ heparin ggt. Pt declined cardiac cath eval. Repeat TTE showing baseline EF around 34%, w/ low suspicion for ischemic etiology. he also had fever and all fever work up negative. Based on the medical record was received from hi PCP from [place], he had right arm pain one month ago and he did all the vascular work up with vascular surgeon in [place] which showed no trombosis in Right Arm AVF. also AVF worked for getting dialysis during HD and no concern for clot /trombois in AVF.  He is stable to discharge home. Dialysis center schedule in [facility] for next Monday in [facility] . His next HD in [place] is set up for when he will come back in [place] in next 2 weeks.     Problem/Plan:    CARDIOVASCULAR  # NSTEMI  -troponin peaked  -finished 48 hours of heparin ggt  - c/w ASA 81 and Effient 10mg     #CAD  - s/p CABG, stents x [id number]  - c/w ASA 81, Plavix 75mg  switched to home mediction Effient 10mg    #HTN  - home meds metoprolol, hydralazine 25mg TID   -Metoprolol titrate stopped  - Tolerated Coreg well inpatient.   -Will discharge on Coreg 12.5 BID      #R/o Heart failure  -TTE 4/22: Basal and mid inferior septum and basal and mid inferior wall are abnormal. Moderately reduced left ventricular systolic function with and ejection fraction of 35-40%. Regional wall motion abnormalities consistent with ischemic heart disease. Normal right ventricular size and systolic function. Normal atria. Moderate aortic stenosis. No pericardial effusion.    PULM  #Acute hypoxic and hypercapnic respiratory failure  - Intubated on 4/20 ane extubated 4/22  -likely 2/2 fluid overload i/s/o missed HD; CXR s/f pleural effusions vs Acute on chronic CHF exacerbation   - S/p  emergent HD in the ED   -S/p HD x 3   -CXR plural effusion improved after HD   - Use home CPAP overnight for Sleep apnea      RENAL  #ESRD  -Right arm AVF, he reported pain and numbness in right arm one month ago. Document from ER physician showed some concern about kathleen syndrome vs trombosis. All the work up on March 22ns at [place] [facility] showed no concern for trombosis or steel syndrome.   -On HD M/W/F     ENDOCRINE  -DMT2 on Home insulin   -On Insulin at home   - HbA1c 7.9       ID  #Severe Sepsis on arrival, Resolved   - Procalcitonin 0.20  - COVID neg x2, COVID ab +, COVID Neucleocapsid negative, per pt's sister, pt got vaccine 2 weeks ago , Per ID clear to transfer to non COVID unit   - UA negative for UTI; no clear source  - Antibiotics held   - New Tmax 101.5 rectal, No source of urinary, lung, blood, wound  infection.   -AV fistula clean, no concern for trombosis, nontender non infected, HD done easily with no increase of AVF pressure.  -Fever self resolved   -All fever work up negative       New medication after discharge: switch from metoprolol titrate to Coreg 12.5 BID   Exam after discharge: Nephrology and Cardiology in [place] (he is visiting right now)  Labs after dicharge: CBC, CMP

## 2021-04-23 NOTE — DISCHARGE NOTE PROVIDER - NSDCCPCAREPLAN_GEN_ALL_CORE_FT
PRINCIPAL DISCHARGE DIAGNOSIS  Diagnosis: Acute respiratory failure with hypoxia  Assessment and Plan of Treatment: You presented to the [facility] emergency room with shortness of breath. You missed session of dialysis before comes to the ED. You were intubated in the ED due to respiratory failure and you were admitted to the ICU and then CCU for rest of work up. Your lung was full of water because you missed dialysis and you had history of congestive heart failure. After 3 session dialysis your lung came back to normal and the respiratory tube removed and you extubated after 2 days. You informed us that you use CPAP at night for sleep apnea. Please continue to use CPAP at home and follow up your dialysis schedule regularly as scheduled [facility] Monday at 3 PM.      SECONDARY DISCHARGE DIAGNOSES  Diagnosis: Pleural effusion due to congestive heart failure  Assessment and Plan of Treatment: Pleural effusion, sometimes referred to as “water on the lungs,” is the build-up of excess fluid between the layers of the pleura outside the lungs. The pleura are thin membranes that line the lungs and the inside of the chest cavity and act to lubricate and facilitate breathing.  The reason that you had pleural effusion was you missed your dialysis session. You received 3 times HD in the hospital. your lung are clear now. Please follow up with HD center next Monday as scheduled at 3 PM    Diagnosis: ESRD on dialysis  Assessment and Plan of Treatment: You have hitory of kidney disease and you get dialysis since 2 years ago. Your right arm fistula works well for getting HD.  follow up your dialysis schedule regularly as scheduled next session at [facility] on Monday at 3 PM.    Diagnosis: HTN (hypertension)  Assessment and Plan of Treatment: We stopped your home medication Metoprolol titrate and started you on Coreg 12.5 two times per day.   Pelase continue to take Coreg and stop Metoprolol titrate.  Continue the rest of your home medication as before    Diagnosis: Diabetes mellitus  Assessment and Plan of Treatment: You hacve Diabete type 2 and home medication is insuling. Please take your insulin as before and follow up with your PCP after you came back to the [place].   Also, we received all the medical record from your PCP : [phone] [place] when you were admitted in the hospital and you received all the medications which used to take it at home. Please continue to take them as previouse schedule except Metoprolo titrate as above explanation.    Diagnosis: Fever  Assessment and Plan of Treatment: You had low grade of fever 101.5 and 100.6 which all the fever work up bl;ood culture, and COVID PCR and antibody came back normal. There i no concern for infection and also no concern for trombosi at the fitula side on the right hand which ealiy used for dialysis. You have been afebrile for the rest of hopitalization. If you have fever in fuutre just follow up with your PCP for more work up.     PRINCIPAL DISCHARGE DIAGNOSIS  Diagnosis: Acute respiratory failure with hypoxia  Assessment and Plan of Treatment: You presented to the [facility] emergency room with shortness of breath. You missed session of dialysis before comes to the ED. You were intubated in the ED due to respiratory failure and you were admitted to the ICU and then CCU for rest of work up. Your lung was full of water because you missed dialysis and you had history of congestive heart failure. After 3 session dialysis your lung came back to normal and the respiratory tube removed and you extubated after 2 days. You informed us that you use CPAP at night for sleep apnea. Please continue to use CPAP at home and follow up your dialysis schedule regularly as [facility]. Please follow up with your primary care doctor and cardiologist when you travel back to [place].      SECONDARY DISCHARGE DIAGNOSES  Diagnosis: Pleural effusion due to congestive heart failure  Assessment and Plan of Treatment: Pleural effusion, sometimes referred to as “water on the lungs,” is the build-up of excess fluid between the layers of the pleura outside the lungs. The pleura are thin membranes that line the lungs and the inside of the chest cavity and act to lubricate and facilitate breathing.  The reason that you had pleural effusion was you missed your dialysis session. You received 3 times HD in the hospital. your lung are clear now. Please follow up with HD center next Monday as scheduled at 3 PM. Please follow up with your primary care doctor and nephrologist when you travel back to [place].    Diagnosis: ESRD on dialysis  Assessment and Plan of Treatment: You have hitory of kidney disease and you get dialysis since 2 years ago. Your right arm fistula works well for getting HD.  follow up your dialysis schedule regularly as [facility].    Diagnosis: HTN (hypertension)  Assessment and Plan of Treatment: We stopped your home medication Metoprolol titrate and started you on Coreg 12.5 two times per day.   Pelase continue to take Coreg and stop Metoprolol titrate.  Continue the rest of your home medication as before    Diagnosis: Diabetes mellitus  Assessment and Plan of Treatment: You hacve Diabete type 2 and home medication is insuling. Please take your insulin as before and follow up with your PCP after you came back to the [place].   Also, we received all the medical record from your PCP : [phone] [place] when you were admitted in the hospital and you received all the medications which used to take it at home. Please continue to take them as previouse schedule except Metoprolo titrate as above explanation.    Diagnosis: Fever  Assessment and Plan of Treatment: You had low grade of fever 101.5 and 100.6 which all the fever work up bl;ood culture, and COVID PCR and antibody came back normal. There i no concern for infection and also no concern for trombosi at the fitula side on the right hand which ealiy used for dialysis. You have been afebrile for the rest of hopitalization. If you have fever in fuutre just follow up with your PCP for more work up.

## 2021-04-24 LAB
COVID-19 NUCLEOCAPSID GAM AB INTERP: NEGATIVE — SIGNIFICANT CHANGE UP
COVID-19 NUCLEOCAPSID TOTAL GAM ANTIBODY RESULT: 0.08 INDEX — SIGNIFICANT CHANGE UP
COVID-19 SPIKE DOMAIN AB INTERP: POSITIVE
COVID-19 SPIKE DOMAIN ANTIBODY RESULT: 51.7 U/ML — HIGH
SARS-COV-2 IGG+IGM SERPL QL IA: 0.08 INDEX — SIGNIFICANT CHANGE UP
SARS-COV-2 IGG+IGM SERPL QL IA: 51.7 U/ML — HIGH
SARS-COV-2 IGG+IGM SERPL QL IA: NEGATIVE — SIGNIFICANT CHANGE UP
SARS-COV-2 IGG+IGM SERPL QL IA: POSITIVE

## 2021-04-25 LAB
CULTURE RESULTS: SIGNIFICANT CHANGE UP
CULTURE RESULTS: SIGNIFICANT CHANGE UP
SPECIMEN SOURCE: SIGNIFICANT CHANGE UP
SPECIMEN SOURCE: SIGNIFICANT CHANGE UP

## 2021-05-04 DIAGNOSIS — I25.5 ISCHEMIC CARDIOMYOPATHY: ICD-10-CM

## 2021-05-04 DIAGNOSIS — I50.23 ACUTE ON CHRONIC SYSTOLIC (CONGESTIVE) HEART FAILURE: ICD-10-CM

## 2021-05-04 DIAGNOSIS — E83.39 OTHER DISORDERS OF PHOSPHORUS METABOLISM: ICD-10-CM

## 2021-05-04 DIAGNOSIS — E87.5 HYPERKALEMIA: ICD-10-CM

## 2021-05-04 DIAGNOSIS — J96.02 ACUTE RESPIRATORY FAILURE WITH HYPERCAPNIA: ICD-10-CM

## 2021-05-04 DIAGNOSIS — Z99.2 DEPENDENCE ON RENAL DIALYSIS: ICD-10-CM

## 2021-05-04 DIAGNOSIS — D63.1 ANEMIA IN CHRONIC KIDNEY DISEASE: ICD-10-CM

## 2021-05-04 DIAGNOSIS — N18.6 END STAGE RENAL DISEASE: ICD-10-CM

## 2021-05-04 DIAGNOSIS — I25.10 ATHEROSCLEROTIC HEART DISEASE OF NATIVE CORONARY ARTERY WITHOUT ANGINA PECTORIS: ICD-10-CM

## 2021-05-04 DIAGNOSIS — I13.2 HYPERTENSIVE HEART AND CHRONIC KIDNEY DISEASE WITH HEART FAILURE AND WITH STAGE 5 CHRONIC KIDNEY DISEASE, OR END STAGE RENAL DISEASE: ICD-10-CM

## 2021-05-04 DIAGNOSIS — Z89.429 ACQUIRED ABSENCE OF OTHER TOE(S), UNSPECIFIED SIDE: ICD-10-CM

## 2021-05-04 DIAGNOSIS — I21.4 NON-ST ELEVATION (NSTEMI) MYOCARDIAL INFARCTION: ICD-10-CM

## 2021-05-04 DIAGNOSIS — R65.21 SEVERE SEPSIS WITH SEPTIC SHOCK: ICD-10-CM

## 2021-05-04 DIAGNOSIS — Z95.1 PRESENCE OF AORTOCORONARY BYPASS GRAFT: ICD-10-CM

## 2021-05-04 DIAGNOSIS — A41.9 SEPSIS, UNSPECIFIED ORGANISM: ICD-10-CM

## 2021-05-04 DIAGNOSIS — Z95.0 PRESENCE OF CARDIAC PACEMAKER: ICD-10-CM

## 2021-05-04 DIAGNOSIS — Z95.5 PRESENCE OF CORONARY ANGIOPLASTY IMPLANT AND GRAFT: ICD-10-CM

## 2021-05-04 DIAGNOSIS — E87.4 MIXED DISORDER OF ACID-BASE BALANCE: ICD-10-CM

## 2021-05-04 DIAGNOSIS — I16.0 HYPERTENSIVE URGENCY: ICD-10-CM

## 2021-05-04 DIAGNOSIS — R06.02 SHORTNESS OF BREATH: ICD-10-CM

## 2021-05-04 DIAGNOSIS — I35.0 NONRHEUMATIC AORTIC (VALVE) STENOSIS: ICD-10-CM

## 2021-05-04 DIAGNOSIS — J96.01 ACUTE RESPIRATORY FAILURE WITH HYPOXIA: ICD-10-CM

## 2022-09-07 NOTE — ED ADULT NURSE NOTE - CADM POA PRESS ULCER
Physical Therapy Discharge    Date: 9/7/2022  Total Number of Visits: 4  Referred by: Guevara Pleitez MD  Medical Diagnosis (from order):   Diagnosis Information      Diagnosis    715.97 (ICD-9-CM) - M19.072 (ICD-10-CM) - Osteoarthritis of left foot                Patient discharged due to not scheduling more appointments.  Status of goals: unable to reassess due to no additional appointments scheduled.             No

## 2024-06-02 NOTE — PROCEDURE NOTE - NSASSISTBY_GEN_A_CORE
Dr. Iverson/Myself
NP
Medical Decision Making / Differential Diagnosis:  HPI concerning for acute diverticulitis, exam warrants empiric coverage with antibiotics as well as CT abdomen.